# Patient Record
Sex: FEMALE | Race: WHITE | NOT HISPANIC OR LATINO | Employment: OTHER | URBAN - METROPOLITAN AREA
[De-identification: names, ages, dates, MRNs, and addresses within clinical notes are randomized per-mention and may not be internally consistent; named-entity substitution may affect disease eponyms.]

---

## 2024-04-11 ENCOUNTER — APPOINTMENT (OUTPATIENT)
Dept: CARDIOLOGY | Facility: HOSPITAL | Age: 74
End: 2024-04-11
Payer: COMMERCIAL

## 2024-04-11 ENCOUNTER — APPOINTMENT (OUTPATIENT)
Dept: RADIOLOGY | Facility: HOSPITAL | Age: 74
End: 2024-04-11
Payer: COMMERCIAL

## 2024-04-11 ENCOUNTER — HOSPITAL ENCOUNTER (OUTPATIENT)
Facility: HOSPITAL | Age: 74
Setting detail: OBSERVATION
Discharge: HOME | End: 2024-04-12
Attending: STUDENT IN AN ORGANIZED HEALTH CARE EDUCATION/TRAINING PROGRAM | Admitting: INTERNAL MEDICINE
Payer: COMMERCIAL

## 2024-04-11 DIAGNOSIS — I20.0 UNSTABLE ANGINA (MULTI): Primary | ICD-10-CM

## 2024-04-11 DIAGNOSIS — I20.0 UNSTABLE ANGINA PECTORIS (MULTI): ICD-10-CM

## 2024-04-11 DIAGNOSIS — I25.9 CHEST PAIN DUE TO MYOCARDIAL ISCHEMIA, UNSPECIFIED ISCHEMIC CHEST PAIN TYPE: ICD-10-CM

## 2024-04-11 DIAGNOSIS — R07.9 CHEST PAIN, UNSPECIFIED TYPE: ICD-10-CM

## 2024-04-11 LAB
ALBUMIN SERPL BCP-MCNC: 4.1 G/DL (ref 3.4–5)
ALP SERPL-CCNC: 104 U/L (ref 33–136)
ALT SERPL W P-5'-P-CCNC: 58 U/L (ref 7–45)
ANION GAP SERPL CALC-SCNC: 12 MMOL/L (ref 10–20)
AST SERPL W P-5'-P-CCNC: 84 U/L (ref 9–39)
BASOPHILS # BLD AUTO: 0.05 X10*3/UL (ref 0–0.1)
BASOPHILS NFR BLD AUTO: 0.7 %
BILIRUB SERPL-MCNC: 0.5 MG/DL (ref 0–1.2)
BNP SERPL-MCNC: 54 PG/ML (ref 0–99)
BUN SERPL-MCNC: 20 MG/DL (ref 6–23)
CALCIUM SERPL-MCNC: 10.3 MG/DL (ref 8.6–10.3)
CARDIAC TROPONIN I PNL SERPL HS: 6 NG/L (ref 0–13)
CARDIAC TROPONIN I PNL SERPL HS: 6 NG/L (ref 0–13)
CHLORIDE SERPL-SCNC: 105 MMOL/L (ref 98–107)
CHOLEST SERPL-MCNC: 230 MG/DL (ref 0–199)
CHOLESTEROL/HDL RATIO: 3.9
CO2 SERPL-SCNC: 24 MMOL/L (ref 21–32)
CREAT SERPL-MCNC: 0.81 MG/DL (ref 0.5–1.05)
EGFRCR SERPLBLD CKD-EPI 2021: 76 ML/MIN/1.73M*2
EOSINOPHIL # BLD AUTO: 0.21 X10*3/UL (ref 0–0.4)
EOSINOPHIL NFR BLD AUTO: 2.8 %
ERYTHROCYTE [DISTWIDTH] IN BLOOD BY AUTOMATED COUNT: 12.7 % (ref 11.5–14.5)
GLUCOSE SERPL-MCNC: 228 MG/DL (ref 74–99)
HCT VFR BLD AUTO: 47.2 % (ref 36–46)
HDLC SERPL-MCNC: 58.4 MG/DL
HGB BLD-MCNC: 15.8 G/DL (ref 12–16)
IMM GRANULOCYTES # BLD AUTO: 0.01 X10*3/UL (ref 0–0.5)
IMM GRANULOCYTES NFR BLD AUTO: 0.1 % (ref 0–0.9)
LDLC SERPL CALC-MCNC: 136 MG/DL
LYMPHOCYTES # BLD AUTO: 1.71 X10*3/UL (ref 0.8–3)
LYMPHOCYTES NFR BLD AUTO: 23.2 %
MAGNESIUM SERPL-MCNC: 1.5 MG/DL (ref 1.6–2.4)
MCH RBC QN AUTO: 29.3 PG (ref 26–34)
MCHC RBC AUTO-ENTMCNC: 33.5 G/DL (ref 32–36)
MCV RBC AUTO: 87 FL (ref 80–100)
MONOCYTES # BLD AUTO: 0.59 X10*3/UL (ref 0.05–0.8)
MONOCYTES NFR BLD AUTO: 8 %
NEUTROPHILS # BLD AUTO: 4.81 X10*3/UL (ref 1.6–5.5)
NEUTROPHILS NFR BLD AUTO: 65.2 %
NON HDL CHOLESTEROL: 172 MG/DL (ref 0–149)
NRBC BLD-RTO: 0 /100 WBCS (ref 0–0)
PLATELET # BLD AUTO: 202 X10*3/UL (ref 150–450)
POTASSIUM SERPL-SCNC: 3.8 MMOL/L (ref 3.5–5.3)
PROT SERPL-MCNC: 7.5 G/DL (ref 6.4–8.2)
RBC # BLD AUTO: 5.4 X10*6/UL (ref 4–5.2)
SODIUM SERPL-SCNC: 137 MMOL/L (ref 136–145)
TRIGL SERPL-MCNC: 178 MG/DL (ref 0–149)
VLDL: 36 MG/DL (ref 0–40)
WBC # BLD AUTO: 7.4 X10*3/UL (ref 4.4–11.3)

## 2024-04-11 PROCEDURE — 80061 LIPID PANEL: CPT | Performed by: INTERNAL MEDICINE

## 2024-04-11 PROCEDURE — 84484 ASSAY OF TROPONIN QUANT: CPT | Performed by: STUDENT IN AN ORGANIZED HEALTH CARE EDUCATION/TRAINING PROGRAM

## 2024-04-11 PROCEDURE — 93005 ELECTROCARDIOGRAM TRACING: CPT

## 2024-04-11 PROCEDURE — 99285 EMERGENCY DEPT VISIT HI MDM: CPT | Mod: 25

## 2024-04-11 PROCEDURE — 2500000001 HC RX 250 WO HCPCS SELF ADMINISTERED DRUGS (ALT 637 FOR MEDICARE OP): Performed by: STUDENT IN AN ORGANIZED HEALTH CARE EDUCATION/TRAINING PROGRAM

## 2024-04-11 PROCEDURE — 80053 COMPREHEN METABOLIC PANEL: CPT | Performed by: STUDENT IN AN ORGANIZED HEALTH CARE EDUCATION/TRAINING PROGRAM

## 2024-04-11 PROCEDURE — 99222 1ST HOSP IP/OBS MODERATE 55: CPT | Performed by: INTERNAL MEDICINE

## 2024-04-11 PROCEDURE — 36415 COLL VENOUS BLD VENIPUNCTURE: CPT | Performed by: STUDENT IN AN ORGANIZED HEALTH CARE EDUCATION/TRAINING PROGRAM

## 2024-04-11 PROCEDURE — 83735 ASSAY OF MAGNESIUM: CPT | Performed by: STUDENT IN AN ORGANIZED HEALTH CARE EDUCATION/TRAINING PROGRAM

## 2024-04-11 PROCEDURE — 83036 HEMOGLOBIN GLYCOSYLATED A1C: CPT | Performed by: INTERNAL MEDICINE

## 2024-04-11 PROCEDURE — 71046 X-RAY EXAM CHEST 2 VIEWS: CPT

## 2024-04-11 PROCEDURE — 71046 X-RAY EXAM CHEST 2 VIEWS: CPT | Performed by: STUDENT IN AN ORGANIZED HEALTH CARE EDUCATION/TRAINING PROGRAM

## 2024-04-11 PROCEDURE — 85025 COMPLETE CBC W/AUTO DIFF WBC: CPT | Performed by: STUDENT IN AN ORGANIZED HEALTH CARE EDUCATION/TRAINING PROGRAM

## 2024-04-11 PROCEDURE — 2500000004 HC RX 250 GENERAL PHARMACY W/ HCPCS (ALT 636 FOR OP/ED): Performed by: EMERGENCY MEDICINE

## 2024-04-11 PROCEDURE — 83880 ASSAY OF NATRIURETIC PEPTIDE: CPT | Performed by: STUDENT IN AN ORGANIZED HEALTH CARE EDUCATION/TRAINING PROGRAM

## 2024-04-11 PROCEDURE — G0378 HOSPITAL OBSERVATION PER HR: HCPCS

## 2024-04-11 PROCEDURE — 2500000001 HC RX 250 WO HCPCS SELF ADMINISTERED DRUGS (ALT 637 FOR MEDICARE OP): Performed by: EMERGENCY MEDICINE

## 2024-04-11 PROCEDURE — 2500000001 HC RX 250 WO HCPCS SELF ADMINISTERED DRUGS (ALT 637 FOR MEDICARE OP): Performed by: INTERNAL MEDICINE

## 2024-04-11 RX ORDER — ONDANSETRON 4 MG/1
4 TABLET, FILM COATED ORAL EVERY 8 HOURS PRN
Status: DISCONTINUED | OUTPATIENT
Start: 2024-04-11 | End: 2024-04-12 | Stop reason: HOSPADM

## 2024-04-11 RX ORDER — ACETAMINOPHEN 160 MG/5ML
650 SUSPENSION ORAL EVERY 4 HOURS PRN
Status: DISCONTINUED | OUTPATIENT
Start: 2024-04-11 | End: 2024-04-12 | Stop reason: HOSPADM

## 2024-04-11 RX ORDER — ATORVASTATIN CALCIUM 40 MG/1
40 TABLET, FILM COATED ORAL NIGHTLY
Status: DISCONTINUED | OUTPATIENT
Start: 2024-04-11 | End: 2024-04-12 | Stop reason: HOSPADM

## 2024-04-11 RX ORDER — NAPROXEN SODIUM 220 MG/1
324 TABLET, FILM COATED ORAL ONCE
Status: COMPLETED | OUTPATIENT
Start: 2024-04-11 | End: 2024-04-11

## 2024-04-11 RX ORDER — NAPROXEN SODIUM 220 MG/1
81 TABLET, FILM COATED ORAL DAILY
Status: DISCONTINUED | OUTPATIENT
Start: 2024-04-12 | End: 2024-04-12 | Stop reason: HOSPADM

## 2024-04-11 RX ORDER — POLYETHYLENE GLYCOL 3350 17 G/17G
17 POWDER, FOR SOLUTION ORAL 2 TIMES DAILY PRN
Status: DISCONTINUED | OUTPATIENT
Start: 2024-04-11 | End: 2024-04-12 | Stop reason: HOSPADM

## 2024-04-11 RX ORDER — ACETAMINOPHEN 650 MG/1
650 SUPPOSITORY RECTAL EVERY 4 HOURS PRN
Status: DISCONTINUED | OUTPATIENT
Start: 2024-04-11 | End: 2024-04-12 | Stop reason: HOSPADM

## 2024-04-11 RX ORDER — DEXTROSE 50 % IN WATER (D50W) INTRAVENOUS SYRINGE
12.5
Status: DISCONTINUED | OUTPATIENT
Start: 2024-04-11 | End: 2024-04-12 | Stop reason: HOSPADM

## 2024-04-11 RX ORDER — DEXTROSE 50 % IN WATER (D50W) INTRAVENOUS SYRINGE
25
Status: DISCONTINUED | OUTPATIENT
Start: 2024-04-11 | End: 2024-04-12 | Stop reason: HOSPADM

## 2024-04-11 RX ORDER — AMINOPHYLLINE 25 MG/ML
125 INJECTION, SOLUTION INTRAVENOUS AS NEEDED
Status: DISCONTINUED | OUTPATIENT
Start: 2024-04-11 | End: 2024-04-12 | Stop reason: HOSPADM

## 2024-04-11 RX ORDER — GUAIFENESIN/DEXTROMETHORPHAN 100-10MG/5
5 SYRUP ORAL EVERY 4 HOURS PRN
Status: DISCONTINUED | OUTPATIENT
Start: 2024-04-11 | End: 2024-04-12 | Stop reason: HOSPADM

## 2024-04-11 RX ORDER — INSULIN LISPRO 100 [IU]/ML
0-10 INJECTION, SOLUTION INTRAVENOUS; SUBCUTANEOUS
Status: DISCONTINUED | OUTPATIENT
Start: 2024-04-11 | End: 2024-04-12 | Stop reason: HOSPADM

## 2024-04-11 RX ORDER — TALC
3 POWDER (GRAM) TOPICAL NIGHTLY PRN
Status: DISCONTINUED | OUTPATIENT
Start: 2024-04-11 | End: 2024-04-12 | Stop reason: HOSPADM

## 2024-04-11 RX ORDER — ACETAMINOPHEN 325 MG/1
650 TABLET ORAL EVERY 4 HOURS PRN
Status: DISCONTINUED | OUTPATIENT
Start: 2024-04-11 | End: 2024-04-12 | Stop reason: HOSPADM

## 2024-04-11 RX ORDER — REGADENOSON 0.08 MG/ML
0.4 INJECTION, SOLUTION INTRAVENOUS
Status: COMPLETED | OUTPATIENT
Start: 2024-04-11 | End: 2024-04-12

## 2024-04-11 RX ORDER — ONDANSETRON HYDROCHLORIDE 2 MG/ML
4 INJECTION, SOLUTION INTRAVENOUS EVERY 8 HOURS PRN
Status: DISCONTINUED | OUTPATIENT
Start: 2024-04-11 | End: 2024-04-12 | Stop reason: HOSPADM

## 2024-04-11 RX ORDER — ALUMINUM HYDROXIDE, MAGNESIUM HYDROXIDE, AND SIMETHICONE 1200; 120; 1200 MG/30ML; MG/30ML; MG/30ML
30 SUSPENSION ORAL EVERY 6 HOURS PRN
Status: DISCONTINUED | OUTPATIENT
Start: 2024-04-11 | End: 2024-04-12 | Stop reason: HOSPADM

## 2024-04-11 RX ORDER — LANOLIN ALCOHOL/MO/W.PET/CERES
800 CREAM (GRAM) TOPICAL ONCE
Status: COMPLETED | OUTPATIENT
Start: 2024-04-11 | End: 2024-04-11

## 2024-04-11 RX ORDER — GUAIFENESIN 600 MG/1
600 TABLET, EXTENDED RELEASE ORAL EVERY 12 HOURS PRN
Status: DISCONTINUED | OUTPATIENT
Start: 2024-04-11 | End: 2024-04-12 | Stop reason: HOSPADM

## 2024-04-11 RX ADMIN — Medication 800 MG: at 23:07

## 2024-04-11 RX ADMIN — ATORVASTATIN CALCIUM 40 MG: 40 TABLET, FILM COATED ORAL at 23:58

## 2024-04-11 RX ADMIN — ASPIRIN 81 MG CHEWABLE TABLET 324 MG: 81 TABLET CHEWABLE at 21:33

## 2024-04-11 RX ADMIN — NITROGLYCERIN 0.5 INCH: 20 OINTMENT TOPICAL at 21:33

## 2024-04-11 ASSESSMENT — COLUMBIA-SUICIDE SEVERITY RATING SCALE - C-SSRS
2. HAVE YOU ACTUALLY HAD ANY THOUGHTS OF KILLING YOURSELF?: NO
1. IN THE PAST MONTH, HAVE YOU WISHED YOU WERE DEAD OR WISHED YOU COULD GO TO SLEEP AND NOT WAKE UP?: NO
6. HAVE YOU EVER DONE ANYTHING, STARTED TO DO ANYTHING, OR PREPARED TO DO ANYTHING TO END YOUR LIFE?: NO

## 2024-04-11 ASSESSMENT — PAIN - FUNCTIONAL ASSESSMENT
PAIN_FUNCTIONAL_ASSESSMENT: 0-10
PAIN_FUNCTIONAL_ASSESSMENT: 0-10

## 2024-04-11 ASSESSMENT — PAIN SCALES - GENERAL
PAINLEVEL_OUTOF10: 0 - NO PAIN
PAINLEVEL_OUTOF10: 0 - NO PAIN

## 2024-04-12 ENCOUNTER — APPOINTMENT (OUTPATIENT)
Dept: RADIOLOGY | Facility: HOSPITAL | Age: 74
End: 2024-04-12
Payer: COMMERCIAL

## 2024-04-12 ENCOUNTER — APPOINTMENT (OUTPATIENT)
Dept: CARDIOLOGY | Facility: HOSPITAL | Age: 74
End: 2024-04-12
Payer: COMMERCIAL

## 2024-04-12 ENCOUNTER — PHARMACY VISIT (OUTPATIENT)
Dept: PHARMACY | Facility: CLINIC | Age: 74
End: 2024-04-12
Payer: COMMERCIAL

## 2024-04-12 VITALS
HEIGHT: 62 IN | RESPIRATION RATE: 14 BRPM | HEART RATE: 96 BPM | OXYGEN SATURATION: 98 % | TEMPERATURE: 97.7 F | WEIGHT: 110 LBS | BODY MASS INDEX: 20.24 KG/M2 | DIASTOLIC BLOOD PRESSURE: 68 MMHG | SYSTOLIC BLOOD PRESSURE: 181 MMHG

## 2024-04-12 PROBLEM — I20.0 UNSTABLE ANGINA (MULTI): Status: ACTIVE | Noted: 2024-04-12

## 2024-04-12 LAB
ANION GAP SERPL CALC-SCNC: 10 MMOL/L (ref 10–20)
AORTIC VALVE MEAN GRADIENT: 4 MMHG
AORTIC VALVE PEAK VELOCITY: 1.42 M/S
AV PEAK GRADIENT: 8.1 MMHG
AVA (PEAK VEL): 2.16 CM2
AVA (VTI): 2 CM2
BUN SERPL-MCNC: 18 MG/DL (ref 6–23)
CALCIUM SERPL-MCNC: 9.4 MG/DL (ref 8.6–10.3)
CHLORIDE SERPL-SCNC: 106 MMOL/L (ref 98–107)
CO2 SERPL-SCNC: 26 MMOL/L (ref 21–32)
CREAT SERPL-MCNC: 0.7 MG/DL (ref 0.5–1.05)
EGFRCR SERPLBLD CKD-EPI 2021: 90 ML/MIN/1.73M*2
EJECTION FRACTION APICAL 4 CHAMBER: 66.7
ERYTHROCYTE [DISTWIDTH] IN BLOOD BY AUTOMATED COUNT: 12.7 % (ref 11.5–14.5)
EST. AVERAGE GLUCOSE BLD GHB EST-MCNC: 286 MG/DL
GLUCOSE BLD MANUAL STRIP-MCNC: 127 MG/DL (ref 74–99)
GLUCOSE BLD MANUAL STRIP-MCNC: 203 MG/DL (ref 74–99)
GLUCOSE SERPL-MCNC: 211 MG/DL (ref 74–99)
HBA1C MFR BLD: 11.6 %
HCT VFR BLD AUTO: 44.3 % (ref 36–46)
HGB BLD-MCNC: 15.1 G/DL (ref 12–16)
LEFT ATRIUM VOLUME AREA LENGTH INDEX BSA: 18.9 ML/M2
LEFT VENTRICLE INTERNAL DIMENSION DIASTOLE: 4 CM (ref 3.5–6)
LEFT VENTRICULAR OUTFLOW TRACT DIAMETER: 1.9 CM
LV EJECTION FRACTION BIPLANE: 65 %
MAGNESIUM SERPL-MCNC: 1.88 MG/DL (ref 1.6–2.4)
MCH RBC QN AUTO: 29.7 PG (ref 26–34)
MCHC RBC AUTO-ENTMCNC: 34.1 G/DL (ref 32–36)
MCV RBC AUTO: 87 FL (ref 80–100)
MITRAL VALVE E/A RATIO: 0.81
MITRAL VALVE E/E' RATIO: 10.82
NRBC BLD-RTO: 0 /100 WBCS (ref 0–0)
PLATELET # BLD AUTO: 177 X10*3/UL (ref 150–450)
POTASSIUM SERPL-SCNC: 4.1 MMOL/L (ref 3.5–5.3)
RBC # BLD AUTO: 5.08 X10*6/UL (ref 4–5.2)
RIGHT VENTRICLE FREE WALL PEAK S': 11.3 CM/S
RIGHT VENTRICLE PEAK SYSTOLIC PRESSURE: 21.8 MMHG
SODIUM SERPL-SCNC: 138 MMOL/L (ref 136–145)
TRICUSPID ANNULAR PLANE SYSTOLIC EXCURSION: 2.2 CM
WBC # BLD AUTO: 6.2 X10*3/UL (ref 4.4–11.3)

## 2024-04-12 PROCEDURE — 3430000001 HC RX 343 DIAGNOSTIC RADIOPHARMACEUTICALS: Performed by: INTERNAL MEDICINE

## 2024-04-12 PROCEDURE — 82947 ASSAY GLUCOSE BLOOD QUANT: CPT | Mod: 59

## 2024-04-12 PROCEDURE — 2500000004 HC RX 250 GENERAL PHARMACY W/ HCPCS (ALT 636 FOR OP/ED): Performed by: INTERNAL MEDICINE

## 2024-04-12 PROCEDURE — A9502 TC99M TETROFOSMIN: HCPCS | Performed by: INTERNAL MEDICINE

## 2024-04-12 PROCEDURE — RXMED WILLOW AMBULATORY MEDICATION CHARGE

## 2024-04-12 PROCEDURE — 93306 TTE W/DOPPLER COMPLETE: CPT

## 2024-04-12 PROCEDURE — 78452 HT MUSCLE IMAGE SPECT MULT: CPT | Performed by: STUDENT IN AN ORGANIZED HEALTH CARE EDUCATION/TRAINING PROGRAM

## 2024-04-12 PROCEDURE — 85027 COMPLETE CBC AUTOMATED: CPT | Performed by: INTERNAL MEDICINE

## 2024-04-12 PROCEDURE — 93017 CV STRESS TEST TRACING ONLY: CPT

## 2024-04-12 PROCEDURE — 99233 SBSQ HOSP IP/OBS HIGH 50: CPT | Performed by: PHYSICIAN ASSISTANT

## 2024-04-12 PROCEDURE — 2500000002 HC RX 250 W HCPCS SELF ADMINISTERED DRUGS (ALT 637 FOR MEDICARE OP, ALT 636 FOR OP/ED): Performed by: INTERNAL MEDICINE

## 2024-04-12 PROCEDURE — 93306 TTE W/DOPPLER COMPLETE: CPT | Performed by: STUDENT IN AN ORGANIZED HEALTH CARE EDUCATION/TRAINING PROGRAM

## 2024-04-12 PROCEDURE — 83735 ASSAY OF MAGNESIUM: CPT | Performed by: PHYSICIAN ASSISTANT

## 2024-04-12 PROCEDURE — G0378 HOSPITAL OBSERVATION PER HR: HCPCS

## 2024-04-12 PROCEDURE — 80048 BASIC METABOLIC PNL TOTAL CA: CPT | Performed by: INTERNAL MEDICINE

## 2024-04-12 PROCEDURE — 78452 HT MUSCLE IMAGE SPECT MULT: CPT

## 2024-04-12 PROCEDURE — 36415 COLL VENOUS BLD VENIPUNCTURE: CPT | Performed by: INTERNAL MEDICINE

## 2024-04-12 PROCEDURE — 2500000001 HC RX 250 WO HCPCS SELF ADMINISTERED DRUGS (ALT 637 FOR MEDICARE OP): Performed by: INTERNAL MEDICINE

## 2024-04-12 RX ORDER — METFORMIN HYDROCHLORIDE 500 MG/1
500 TABLET, EXTENDED RELEASE ORAL 3 TIMES DAILY
COMMUNITY
Start: 2021-09-14 | End: 2024-06-11 | Stop reason: HOSPADM

## 2024-04-12 RX ORDER — ATORVASTATIN CALCIUM 20 MG/1
20 TABLET, FILM COATED ORAL DAILY
COMMUNITY
Start: 2024-04-09

## 2024-04-12 RX ORDER — NAPROXEN SODIUM 220 MG/1
81 TABLET, FILM COATED ORAL DAILY
Qty: 30 TABLET | Refills: 0 | Status: SHIPPED | OUTPATIENT
Start: 2024-04-13 | End: 2024-05-13

## 2024-04-12 RX ADMIN — TETROFOSMIN 10 MILLICURIE: 0.23 INJECTION, POWDER, LYOPHILIZED, FOR SOLUTION INTRAVENOUS at 07:40

## 2024-04-12 RX ADMIN — REGADENOSON 0.4 MG: 0.08 INJECTION, SOLUTION INTRAVENOUS at 09:14

## 2024-04-12 RX ADMIN — ASPIRIN 81 MG CHEWABLE TABLET 81 MG: 81 TABLET CHEWABLE at 08:01

## 2024-04-12 RX ADMIN — INSULIN LISPRO 4 UNITS: 100 INJECTION, SOLUTION INTRAVENOUS; SUBCUTANEOUS at 08:01

## 2024-04-12 RX ADMIN — TETROFOSMIN 30 MILLICURIE: 0.23 INJECTION, POWDER, LYOPHILIZED, FOR SOLUTION INTRAVENOUS at 09:10

## 2024-04-12 SDOH — SOCIAL STABILITY: SOCIAL INSECURITY: HAS ANYONE EVER THREATENED TO HURT YOUR FAMILY OR YOUR PETS?: NO

## 2024-04-12 SDOH — SOCIAL STABILITY: SOCIAL INSECURITY: HAVE YOU HAD THOUGHTS OF HARMING ANYONE ELSE?: NO

## 2024-04-12 SDOH — SOCIAL STABILITY: SOCIAL INSECURITY: ARE THERE ANY APPARENT SIGNS OF INJURIES/BEHAVIORS THAT COULD BE RELATED TO ABUSE/NEGLECT?: NO

## 2024-04-12 SDOH — SOCIAL STABILITY: SOCIAL INSECURITY: WERE YOU ABLE TO COMPLETE ALL THE BEHAVIORAL HEALTH SCREENINGS?: YES

## 2024-04-12 SDOH — SOCIAL STABILITY: SOCIAL INSECURITY: DOES ANYONE TRY TO KEEP YOU FROM HAVING/CONTACTING OTHER FRIENDS OR DOING THINGS OUTSIDE YOUR HOME?: NO

## 2024-04-12 SDOH — SOCIAL STABILITY: SOCIAL INSECURITY: DO YOU FEEL UNSAFE GOING BACK TO THE PLACE WHERE YOU ARE LIVING?: NO

## 2024-04-12 SDOH — SOCIAL STABILITY: SOCIAL INSECURITY: ABUSE: ADULT

## 2024-04-12 SDOH — SOCIAL STABILITY: SOCIAL INSECURITY: ARE YOU OR HAVE YOU BEEN THREATENED OR ABUSED PHYSICALLY, EMOTIONALLY, OR SEXUALLY BY ANYONE?: NO

## 2024-04-12 SDOH — SOCIAL STABILITY: SOCIAL INSECURITY: DO YOU FEEL ANYONE HAS EXPLOITED OR TAKEN ADVANTAGE OF YOU FINANCIALLY OR OF YOUR PERSONAL PROPERTY?: NO

## 2024-04-12 ASSESSMENT — ENCOUNTER SYMPTOMS
DIZZINESS: 0
FEVER: 0
HEADACHES: 0
CHEST TIGHTNESS: 0
JOINT SWELLING: 0
FLANK PAIN: 0
VOMITING: 0
FATIGUE: 0
BLOOD IN STOOL: 0
NAUSEA: 0
HEMATURIA: 0
PALPITATIONS: 0
ABDOMINAL PAIN: 0
SHORTNESS OF BREATH: 0
WHEEZING: 0
CONSTIPATION: 0
DIARRHEA: 0
WOUND: 0
NUMBNESS: 0
FACIAL SWELLING: 0
COUGH: 0
HALLUCINATIONS: 0
LIGHT-HEADEDNESS: 0
BACK PAIN: 0
FREQUENCY: 0
SORE THROAT: 0
EYE PAIN: 0
TROUBLE SWALLOWING: 0
DIAPHORESIS: 0
APPETITE CHANGE: 0
BRUISES/BLEEDS EASILY: 0
DYSURIA: 0
CHILLS: 0
WEAKNESS: 0

## 2024-04-12 ASSESSMENT — ACTIVITIES OF DAILY LIVING (ADL)
BATHING: INDEPENDENT
DRESSING YOURSELF: INDEPENDENT
GROOMING: INDEPENDENT
LACK_OF_TRANSPORTATION: NO
WALKS IN HOME: INDEPENDENT
FEEDING YOURSELF: INDEPENDENT
TOILETING: INDEPENDENT
ASSISTIVE_DEVICE: DENTURES LOWER;DENTURES UPPER;EYEGLASSES
PATIENT'S MEMORY ADEQUATE TO SAFELY COMPLETE DAILY ACTIVITIES?: YES
HEARING - RIGHT EAR: FUNCTIONAL
JUDGMENT_ADEQUATE_SAFELY_COMPLETE_DAILY_ACTIVITIES: YES
HEARING - LEFT EAR: FUNCTIONAL
ADEQUATE_TO_COMPLETE_ADL: YES

## 2024-04-12 ASSESSMENT — LIFESTYLE VARIABLES
SKIP TO QUESTIONS 9-10: 1
AUDIT-C TOTAL SCORE: 0
SUBSTANCE_ABUSE_PAST_12_MONTHS: NO
HOW OFTEN DO YOU HAVE A DRINK CONTAINING ALCOHOL: NEVER
HOW MANY STANDARD DRINKS CONTAINING ALCOHOL DO YOU HAVE ON A TYPICAL DAY: PATIENT DOES NOT DRINK
HOW OFTEN DO YOU HAVE 6 OR MORE DRINKS ON ONE OCCASION: NEVER
AUDIT-C TOTAL SCORE: 0
PRESCIPTION_ABUSE_PAST_12_MONTHS: NO

## 2024-04-12 ASSESSMENT — COLUMBIA-SUICIDE SEVERITY RATING SCALE - C-SSRS
6. HAVE YOU EVER DONE ANYTHING, STARTED TO DO ANYTHING, OR PREPARED TO DO ANYTHING TO END YOUR LIFE?: NO
1. IN THE PAST MONTH, HAVE YOU WISHED YOU WERE DEAD OR WISHED YOU COULD GO TO SLEEP AND NOT WAKE UP?: NO
2. HAVE YOU ACTUALLY HAD ANY THOUGHTS OF KILLING YOURSELF?: NO

## 2024-04-12 ASSESSMENT — COGNITIVE AND FUNCTIONAL STATUS - GENERAL
PATIENT BASELINE BEDBOUND: NO
DAILY ACTIVITIY SCORE: 24
MOBILITY SCORE: 24

## 2024-04-12 ASSESSMENT — PAIN SCALES - GENERAL: PAINLEVEL_OUTOF10: 0 - NO PAIN

## 2024-04-12 ASSESSMENT — PATIENT HEALTH QUESTIONNAIRE - PHQ9
SUM OF ALL RESPONSES TO PHQ9 QUESTIONS 1 & 2: 1
1. LITTLE INTEREST OR PLEASURE IN DOING THINGS: NOT AT ALL
2. FEELING DOWN, DEPRESSED OR HOPELESS: SEVERAL DAYS

## 2024-04-12 NOTE — H&P
[facility]    Hospital Medicine History & Physical     Assessment & Plan     ASSESSMENT    73F with hx of tobacco abuse and NIDDM Type II presents with chest pain.     PLAN    Chest Pain  -Non-exertional, substernal pressure sensation  -No hx of CAD but multiple risk factors (smoker, HLD, NIDDM type II)  PLAN  -S/p ASA 325mg x1, ASA 81mg every day  -Nuclear stress test in the morning  -Telemetry  -Lipid panel and HgA1c    Other Issues  -HLD: Home statin. Check lipid panel  -NIDDM Type II: MDSS. Check HgA1c  -Tobacco Abuse:  cessation. Declined nicotine supplementation     DVT Prophy  -SCDs    Disposition  -Med Tele       Mehran Preciado MD    History of Present Illness     Johnny Motta is a 73 y.o. with hx of tobacco abuse, HLD, and NIDDM Type II presents with chest pain. Patient was sitting at her computer earlier in the day and started experiencing substernal chest pain that radiated to her left breast. Denies radiation to left arm or jaw. Pressure sensation. Denies associated SOB or diaphoresis. Lasted about 20mn. Took 2 81mg ASA. No hx of CAD or strokes. Has never had a stress test done before. In the ED, VSS. Trops neg x2. EKG with RBBB but no ischemic changes. Given ASA and admitted to medicine.    Review of Systems     A Comprehensive greater than 10 system review of systems was carried out.  Pertinent positives and negatives are noted above.  Otherwise negative for contributory information.     Past Medical History     Past Medical History:   Diagnosis Date    Diabetes (CMS/HCC)     Tobacco abuse      Medications   Medication reconciliation not yet complete      Past Surgical History     Past Surgical History:   Procedure Laterality Date    CHOLECYSTECTOMY  09/12/2018    Cholecystectomy     Family History   Reviewed and non-contributory to presenting complaints    Allergies     Allergies   Allergen Reactions    Penicillin G Rash     Social History     Social  "History     Tobacco Use    Smoking status: Every Day     Types: Cigarettes    Smokeless tobacco: Never   Substance Use Topics    Alcohol use: Never     Physical Exam   Blood pressure 113/74, pulse 68, temperature 37.4 °C (99.3 °F), temperature source Tympanic, resp. rate 18, height 1.575 m (5' 2\"), weight 49.9 kg (110 lb), SpO2 96%.    General: Ill appearing, cooperative with exam, in NAD.  HEENT: Atraumatic. No erythema in posterior pharynx.  Lymph: No cervical or inguinal lymphadenopathy.  Cardiac: RRR. No murmurs.  Lungs: CTAB. Nl WOB.  Abd: Non-tender. No rebound or gaurding. Nl bowel sounds.  Ext: No edema. 2+ pulses.  Skin: No rashes, abrasions, or contusions.  Psych: A&Ox3. Nl affect.  Neuro: 5/5 strength. Sensation intact.    Labs & Imaging     Reviewed and Pertinent results discussed in assessment and plan.      "

## 2024-04-12 NOTE — CARE PLAN
"The patient's goals for the shift include \"Sleep.\"    The clinical goals for the shift include Patient will remain free from acute cardiorespiratory distress for the length of my shift.    Over the shift, the patient did make progress toward the following goals.     Problem: Pain - Adult  Goal: Verbalizes/displays adequate comfort level or baseline comfort level  Outcome: Progressing     Problem: Safety - Adult  Goal: Free from fall injury  Outcome: Progressing     Problem: Discharge Planning  Goal: Discharge to home or other facility with appropriate resources  Outcome: Progressing     Problem: Chronic Conditions and Co-morbidities  Goal: Patient's chronic conditions and co-morbidity symptoms are monitored and maintained or improved  Outcome: Progressing     Problem: Diabetes  Goal: Achieve decreasing blood glucose levels by end of shift  Outcome: Progressing  Goal: Increase stability of blood glucose readings by end of shift  Outcome: Progressing  Goal: Maintain electrolyte levels within acceptable range throughout shift  Outcome: Progressing  Goal: Maintain glucose levels >70mg/dl to <250mg/dl throughout shift  Outcome: Progressing  Goal: No changes in neurological exam by end of shift  Outcome: Progressing  Goal: Learn about and adhere to nutrition recommendations by end of shift  Outcome: Progressing  Goal: Vital signs within normal range for age by end of shift  Outcome: Progressing  Goal: Increase self care and/or family involovement by end of shift  Outcome: Progressing  Goal: Receive DSME education by end of shift  Outcome: Progressing     "

## 2024-04-12 NOTE — CARE PLAN
"The patient's goals for the shift include \"Sleep.\"    The clinical goals for the shift include Patient will remain free from acute cardiorespiratory distress for the length of my shift.    Problem: Pain - Adult  Goal: Verbalizes/displays adequate comfort level or baseline comfort level  Outcome: Progressing     Problem: Safety - Adult  Goal: Free from fall injury  Outcome: Progressing     Problem: Discharge Planning  Goal: Discharge to home or other facility with appropriate resources  Outcome: Progressing     Problem: Chronic Conditions and Co-morbidities  Goal: Patient's chronic conditions and co-morbidity symptoms are monitored and maintained or improved  Outcome: Progressing     Problem: Diabetes  Goal: Achieve decreasing blood glucose levels by end of shift  Outcome: Progressing  Goal: Increase stability of blood glucose readings by end of shift  Outcome: Progressing  Goal: Maintain electrolyte levels within acceptable range throughout shift  Outcome: Progressing  Goal: Maintain glucose levels >70mg/dl to <250mg/dl throughout shift  Outcome: Progressing  Goal: No changes in neurological exam by end of shift  Outcome: Progressing  Goal: Learn about and adhere to nutrition recommendations by end of shift  Outcome: Progressing  Goal: Vital signs within normal range for age by end of shift  Outcome: Progressing  Goal: Increase self care and/or family involovement by end of shift  Outcome: Progressing  Goal: Receive DSME education by end of shift  Outcome: Progressing       "

## 2024-04-12 NOTE — ED PROVIDER NOTES
[facility]  Emergency department provider transition of care note       Assumed care of patient that was signed out to me in stable condition with work-up /disposition pending.    History/Exam limitations: none.   Additional history was obtained from past medical records.    HPI: Johnny Motta  is a 73 y.o. with a complaint of   Chief Complaint   Patient presents with    Chest Pain     1830 had episode of chest pain lasting about 20 min.  Then became really tired  seen at [facility] and sent here for further evaluation  denies SOB or nausea with pain       Past medical history and surgical history reviewed and as documented.  History reviewed and as noted. past medical records reviewed.    Past medical records, triage/nursing notes and vital signs reviewed as available and as noted above.    PHYSICAL EXAM  Vital Signs reviewed and noted to be within normal limits. the patient is not hypoxic.  -General: Alert, no acute distress, patient resting comfortably  -Skin: warm, intact, no pallor noted  -Head: Normocephalic, atraumatic  -Eye: Normal conjunctiva  -Cardiac: Normal peripheral perfusion  -Respiratory: No acute distress  -Musculoskeletal: No deformity, full ROM.  -Neurological: alert and oriented, normal sensory and motor observed.  -Psychiatric: Cooperative        Labs and imaging reviewed by me  and note     Labs Reviewed   CBC WITH AUTO DIFFERENTIAL - Abnormal       Result Value    WBC 7.4      nRBC 0.0      RBC 5.40 (*)     Hemoglobin 15.8      Hematocrit 47.2 (*)     MCV 87      MCH 29.3      MCHC 33.5      RDW 12.7      Platelets 202      Neutrophils % 65.2      Immature Granulocytes %, Automated 0.1      Lymphocytes % 23.2      Monocytes % 8.0      Eosinophils % 2.8      Basophils % 0.7      Neutrophils Absolute 4.81      Immature Granulocytes Absolute, Automated 0.01      Lymphocytes Absolute 1.71      Monocytes Absolute 0.59      Eosinophils Absolute 0.21      Basophils Absolute 0.05      COMPREHENSIVE METABOLIC PANEL - Abnormal    Glucose 228 (*)     Sodium 137      Potassium 3.8      Chloride 105      Bicarbonate 24      Anion Gap 12      Urea Nitrogen 20      Creatinine 0.81      eGFR 76      Calcium 10.3      Albumin 4.1      Alkaline Phosphatase 104      Total Protein 7.5      AST 84 (*)     Bilirubin, Total 0.5      ALT 58 (*)    MAGNESIUM - Abnormal    Magnesium 1.50 (*)    B-TYPE NATRIURETIC PEPTIDE - Normal    BNP 54      Narrative:        <100 pg/mL - Heart failure unlikely  100-299 pg/mL - Intermediate probability of acute heart                  failure exacerbation. Correlate with clinical                  context and patient history.    >=300 pg/mL - Heart Failure likely. Correlate with clinical                  context and patient history.    BNP testing is performed using different testing methodology at [facility] than at other [facility] hospitals. Direct result comparisons should only be made within the same method.      SERIAL TROPONIN-INITIAL - Normal    Troponin I, High Sensitivity 6      Narrative:     Less than 99th percentile of normal range cutoff-  Female and children under 18 years old <14 ng/L; Male <21 ng/L: Negative  Repeat testing should be performed if clinically indicated.     Female and children under 18 years old 14-50 ng/L; Male 21-50 ng/L:  Consistent with possible cardiac damage and possible increased clinical   risk. Serial measurements may help to assess extent of myocardial damage.     >50 ng/L: Consistent with cardiac damage, increased clinical risk and  myocardial infarction. Serial measurements may help assess extent of   myocardial damage.      NOTE: Children less than 1 year old may have higher baseline troponin   levels and results should be interpreted in conjunction with the overall   clinical context.     NOTE: Troponin I testing is performed using a different   testing methodology at [facility] than at other   system  [facility]. Direct result comparisons should only   be made within the same method.   SERIAL TROPONIN, 1 HOUR - Normal    Troponin I, High Sensitivity 6      Narrative:     Less than 99th percentile of normal range cutoff-  Female and children under 18 years old <14 ng/L; Male <21 ng/L: Negative  Repeat testing should be performed if clinically indicated.     Female and children under 18 years old 14-50 ng/L; Male 21-50 ng/L:  Consistent with possible cardiac damage and possible increased clinical   risk. Serial measurements may help to assess extent of myocardial damage.     >50 ng/L: Consistent with cardiac damage, increased clinical risk and  myocardial infarction. Serial measurements may help assess extent of   myocardial damage.      NOTE: Children less than 1 year old may have higher baseline troponin   levels and results should be interpreted in conjunction with the overall   clinical context.     NOTE: Troponin I testing is performed using a different   testing methodology at [facility] than at other   [facility]. Direct result comparisons should only   be made within the same method.   TROPONIN SERIES- (INITIAL, 1 HR)    Narrative:     The following orders were created for panel order Troponin I Series, High Sensitivity (0, 1 HR).  Procedure                               Abnormality         Status                     ---------                               -----------         ------                     Troponin I, High Sensiti...[[id number]]  Normal              Final result               Troponin, High Sensitivi...[[id number]]  Normal              Final result                 Please view results for these tests on the individual orders.      XR chest 2 views   Final Result   1.  No evidence of acute cardiopulmonary process.                  MACRO:   None        Signed by: Souleymane Cueva 4/11/2024 9:12 PM   Dictation workstation:   [id number]               Procedure  Procedures    Medications   magnesium oxide (Mag-Ox) tablet 800 mg (has no administration in time range)   aspirin chewable tablet 324 mg (324 mg oral Given 4/11/24 2133)   nitroglycerin (Hugo-Bid) 2 % ointment 0.5 inch (0.5 inches transdermal Given 4/11/24 2133)        ED Course as of 04/11/24 2241   Thu Apr 11, 2024 2017 EKG is interpreted by myself demonstrates sinus rhythm with a ventricular rate of 92, right bundle branch block noted, no evidence of an acute STEMI although the right bundle block is new from 2013 [NS]      ED Course User Index  [NS] Sarmad Dia MD         Diagnoses as of 04/11/24 2241   Unstable angina (CMS/HCC)        1. Unstable angina (CMS/HCC)             DISPOSITION: Admit to observation    Marty LeJeune, DO  Internal & Emergency Medicine  10:41 PM   04/11/24        Marty R Lejeune, DO  Resident  04/11/24 2242

## 2024-04-12 NOTE — PROGRESS NOTES
Discharge planning assessment attempted. Patient is currently out of the room for testing/procedure. Patient is OBS status, here for cardiac workup. No discharge needs anticipated. TCC following, will attempt to meet with patient once they have returned to their room.

## 2024-04-12 NOTE — PROGRESS NOTES
Johnny Motta is a 73 y.o. female on day 0 of admission presenting with Chest pain.      Subjective   Johnny Motta is a 73 y.o. with hx of tobacco abuse, HLD, and NIDDM Type II presents with chest pain. Patient was sitting at her computer earlier in the day and started experiencing substernal chest pain that radiated to her left breast. Denies radiation to left arm or jaw. Pressure sensation. Denies associated SOB or diaphoresis. Lasted about 20mn. Took 2 81mg ASA. No hx of CAD or strokes. Has never had a stress test done before. In the ED, VSS. Trops neg x2. BNP neg. EKG with RBBB but no ischemic changes.     04/12/24: No acute events overnight. Vitals stable. Labs largely unremarkable.  Hyperglycemia 211. Mag WNL.  She did have some chest pressure though very minimal during stress test.  Echocardiogram with EF 60 to 65%, positive diastolic dysfunction, no wall motion abnormalities.  Noted her A1c is 11.6, we discussed this, she was just seen [facility], she had not been on any medications for her diabetes or her high cholesterol for the proximately the past 2 years, she was just started back on metformin, we discussed lifestyle modifications and that she may need addition of further medications to treat her uncontrolled diabetes, she wishes to follow this up with her primary care.  She is hopeful discharge home today if her stress test is negative         Review of Systems   Constitutional:  Negative for appetite change, chills, diaphoresis, fatigue and fever.   HENT:  Negative for congestion, ear pain, facial swelling, hearing loss, nosebleeds, sore throat, tinnitus and trouble swallowing.    Eyes:  Negative for pain.   Respiratory:  Negative for cough, chest tightness, shortness of breath and wheezing.    Cardiovascular:  Negative for chest pain, palpitations and leg swelling.   Gastrointestinal:  Negative for abdominal pain, blood in stool, constipation, diarrhea,  nausea and vomiting.   Genitourinary:  Negative for dysuria, flank pain, frequency, hematuria and urgency.   Musculoskeletal:  Negative for back pain and joint swelling.   Skin:  Negative for rash and wound.   Neurological:  Negative for dizziness, syncope, weakness, light-headedness, numbness and headaches.   Hematological:  Does not bruise/bleed easily.   Psychiatric/Behavioral:  Negative for behavioral problems, hallucinations and suicidal ideas.           Objective     Last Recorded Vitals  /67 (BP Location: Left arm, Patient Position: Lying)   Pulse 70   Temp 36 °C (96.8 °F) (Temporal)   Resp 16   Wt 49.9 kg (110 lb)   SpO2 94%     Image Results  Transthoracic Echo (TTE) Complete    Result Date: 4/12/2024              [place] [address]      Phone [phone] Fax [phone] TRANSTHORACIC ECHOCARDIOGRAM REPORT  Patient Name:      LITA MCCLURE FLACA Carty Physician:    [id number] Downey MD Study Date:        4/12/2024             Ordering Provider:    [id number] ILDA BROCK MRN/PID:           [id number]              Fellow: Accession#:        [id number]          Nurse: Date of Birth/Age: 1950 / 73 years Sonographer:          Swapna Antoine RDCS Gender:            F                     Additional Staff: Height:            157.48 cm             Admit Date:           4/11/2024 Weight:            49.90 kg              Admission Status:     Inpatient -                                                                Routine BSA / BMI:         1.48 m2 / 20.12 kg/m2 Department Location:  [facility]                                                                Lab Blood Pressure: 116 /67 mmHg Study Type:    TRANSTHORACIC ECHO (TTE) COMPLETE Diagnosis/ICD: Chest pain, unspecified-R07.9 Indication:    Chest Pain CPT  Codes:     Echo Complete w Full [id number] Patient History: No pertinent past medical history. Study Detail: The following Echo studies were performed: 2D, M-Mode, Doppler and               color flow.  PHYSICIAN INTERPRETATION: Left Ventricle: Left ventricular systolic function is normal, with an estimated ejection fraction of 60-65%. There are no regional wall motion abnormalities. The left ventricular cavity size is normal. Spectral Doppler shows an impaired relaxation pattern of left ventricular diastolic filling. Left Atrium: The left atrium is normal in size. Right Ventricle: The right ventricle is normal in size. There is normal right ventricular global systolic function. Right Atrium: The right atrium is mildly dilated. Aortic Valve: The aortic valve is trileaflet. There is no evidence of aortic valve regurgitation. The peak instantaneous gradient of the aortic valve is 8.1 mmHg. The mean gradient of the aortic valve is 4.0 mmHg. Mitral Valve: The mitral valve is normal in structure. There is trace mitral valve regurgitation. Tricuspid Valve: The tricuspid valve is structurally normal. There is trace tricuspid regurgitation. The Doppler estimated RVSP is within normal limits at 21.8 mmHg. Pulmonic Valve: The pulmonic valve is not well visualized. There is trace pulmonic valve regurgitation. Pericardium: There is no pericardial effusion noted. There is a pericardial fat pad present. Aorta: The aortic root is normal. Systemic Veins: The inferior vena cava size appears small.  CONCLUSIONS:  1. Left ventricular systolic function is normal with a 60-65% estimated ejection fraction.  2. Spectral Doppler shows an impaired relaxation pattern of left ventricular diastolic filling.  3. RVSP within normal limits. QUANTITATIVE DATA SUMMARY: 2D MEASUREMENTS:                          Normal Ranges: Ao Root d:     3.00 cm   (2.0-3.7cm) LAs:           3.10 cm   (2.7-4.0cm) IVSd:          0.80 cm   (0.6-1.1cm) LVPWd:          0.80 cm   (0.6-1.1cm) LVIDd:         4.00 cm   (3.9-5.9cm) LVIDs:         2.30 cm LV Mass Index: 63.0 g/m2 LV % FS        42.5 % LA VOLUME:                               Normal Ranges: LA Vol A4C:        23.1 ml    (22+/-6mL/m2) LA Vol A2C:        30.1 ml LA Vol BP:         28.0 ml LA Vol Index A4C:  15.6ml/m2 LA Vol Index A2C:  20.3 ml/m2 LA Vol Index BP:   18.9 ml/m2 LA Area A4C:       12.0 cm2 LA Area A2C:       12.9 cm2 LA Major Axis A4C: 5.3 cm LA Major Axis A2C: 4.7 cm LA Volume Index:   18.9 ml/m2 RA VOLUME BY A/L METHOD:                       Normal Ranges: RA Area A4C: 14.1 cm2 AORTA MEASUREMENTS:                      Normal Ranges: Ao Sinus, d: 3.00 cm (2.1-3.5cm) Ao STJ, d:   2.60 cm (1.7-3.4cm) Asc Ao, d:   2.90 cm (2.1-3.4cm) LV SYSTOLIC FUNCTION BY 2D PLANIMETRY (MOD):                     Normal Ranges: EF-A4C View: 66.7 % (>=55%) EF-A2C View: 63.7 % EF-Biplane:  64.8 % LV DIASTOLIC FUNCTION:                           Normal Ranges: MV Peak E:    0.70 m/s    (0.7-1.2 m/s) MV Peak A:    0.87 m/s    (0.42-0.7 m/s) E/A Ratio:    0.81        (1.0-2.2) MV e'         0.06 m/s    (>8.0) MV lateral e' 0.08 m/s MV medial e'  0.05 m/s MV A Dur:     135.00 msec E/e' Ratio:   10.82       (<8.0) AORTIC VALVE:                                   Normal Ranges: AoV Vmax:                1.42 m/s (<=1.7m/s) AoV Peak P.1 mmHg (<20mmHg) AoV Mean P.0 mmHg (1.7-11.5mmHg) LVOT Max Devyn:            1.08 m/s (<=1.1m/s) AoV VTI:                 31.00 cm (18-25cm) LVOT VTI:                21.90 cm LVOT Diameter:           1.90 cm  (1.8-2.4cm) AoV Area, VTI:           2.00 cm2 (2.5-5.5cm2) AoV Area,Vmax:           2.16 cm2 (2.5-4.5cm2) AoV Dimensionless Index: 0.71  RIGHT VENTRICLE: RV Basal 3.30 cm RV Mid   2.50 cm RV Major 6.6 cm TAPSE:   22.4 mm RV s'    0.11 m/s TRICUSPID VALVE/RVSP:                             Normal Ranges: Peak TR Velocity: 2.17 m/s RV Syst Pressure: 21.8 mmHg (< 30mmHg)  IVC Diam:         1.00 cm  [id number] Davis Downey MD Electronically signed on 4/12/2024 at 11:08:05 AM  ** Final **     ECG 12 lead    Result Date: 4/12/2024  Sinus rhythm Probable left atrial enlargement Right bundle branch block    ECG 12 lead    Result Date: 4/12/2024  Sinus rhythm Probable left atrial enlargement Right bundle branch block    XR chest 2 views    Result Date: 4/11/2024  Interpreted By:  Souleymane Cueva, STUDY: XR CHEST 2 VIEWS;  4/11/2024 8:44 pm   INDICATION: Signs/Symptoms:Chest Pain.   COMPARISON: Radiographs of the chest dated 12/30/2014.   ACCESSION NUMBER(S): [id number]   ORDERING CLINICIAN: QUINN PENDLETON   FINDINGS: PA and lateral radiographs of the chest were provided.       CARDIOMEDIASTINAL SILHOUETTE: Cardiomediastinal silhouette is normal in size and configuration.   LUNGS: Lungs are clear.   ABDOMEN: No remarkable upper abdominal findings.   BONES: No acute osseous changes.       1.  No evidence of acute cardiopulmonary process.       MACRO: None   Signed by: Souleymane Cueva 4/11/2024 9:12 PM Dictation workstation:   [id number]       Lab Results  Results for orders placed or performed during the hospital encounter of 04/11/24 (from the past 24 hour(s))   ECG 12 lead   Result Value Ref Range    Ventricular Rate 92 BPM    Atrial Rate 92 BPM    NE Interval 124 ms    QRS Duration 128 ms    QT Interval 365 ms    QTC Calculation(Bazett) 452 ms    P Axis 80 degrees    R Axis 2 degrees    T Axis 7 degrees    QRS Count 14 beats    Q Onset 251 ms    T Offset 433 ms    QTC Fredericia 420 ms   CBC and Auto Differential   Result Value Ref Range    WBC 7.4 4.4 - 11.3 x10*3/uL    nRBC 0.0 0.0 - 0.0 /100 WBCs    RBC 5.40 (H) 4.00 - 5.20 x10*6/uL    Hemoglobin 15.8 12.0 - 16.0 g/dL    Hematocrit 47.2 (H) 36.0 - 46.0 %    MCV 87 80 - 100 fL    MCH 29.3 26.0 - 34.0 pg    MCHC 33.5 32.0 - 36.0 g/dL    RDW 12.7 11.5 - 14.5 %    Platelets 202 150 - 450 x10*3/uL    Neutrophils % 65.2  40.0 - 80.0 %    Immature Granulocytes %, Automated 0.1 0.0 - 0.9 %    Lymphocytes % 23.2 13.0 - 44.0 %    Monocytes % 8.0 2.0 - 10.0 %    Eosinophils % 2.8 0.0 - 6.0 %    Basophils % 0.7 0.0 - 2.0 %    Neutrophils Absolute 4.81 1.60 - 5.50 x10*3/uL    Immature Granulocytes Absolute, Automated 0.01 0.00 - 0.50 x10*3/uL    Lymphocytes Absolute 1.71 0.80 - 3.00 x10*3/uL    Monocytes Absolute 0.59 0.05 - 0.80 x10*3/uL    Eosinophils Absolute 0.21 0.00 - 0.40 x10*3/uL    Basophils Absolute 0.05 0.00 - 0.10 x10*3/uL   Comprehensive Metabolic Panel   Result Value Ref Range    Glucose 228 (H) 74 - 99 mg/dL    Sodium 137 136 - 145 mmol/L    Potassium 3.8 3.5 - 5.3 mmol/L    Chloride 105 98 - 107 mmol/L    Bicarbonate 24 21 - 32 mmol/L    Anion Gap 12 10 - 20 mmol/L    Urea Nitrogen 20 6 - 23 mg/dL    Creatinine 0.81 0.50 - 1.05 mg/dL    eGFR 76 >60 mL/min/1.73m*2    Calcium 10.3 8.6 - 10.3 mg/dL    Albumin 4.1 3.4 - 5.0 g/dL    Alkaline Phosphatase 104 33 - 136 U/L    Total Protein 7.5 6.4 - 8.2 g/dL    AST 84 (H) 9 - 39 U/L    Bilirubin, Total 0.5 0.0 - 1.2 mg/dL    ALT 58 (H) 7 - 45 U/L   Magnesium   Result Value Ref Range    Magnesium 1.50 (L) 1.60 - 2.40 mg/dL   B-type natriuretic peptide   Result Value Ref Range    BNP 54 0 - 99 pg/mL   Troponin I, High Sensitivity, Initial   Result Value Ref Range    Troponin I, High Sensitivity 6 0 - 13 ng/L   Lipid panel   Result Value Ref Range    Cholesterol 230 (H) 0 - 199 mg/dL    HDL-Cholesterol 58.4 mg/dL    Cholesterol/HDL Ratio 3.9     LDL Calculated 136 (H) <=99 mg/dL    VLDL 36 0 - 40 mg/dL    Triglycerides 178 (H) 0 - 149 mg/dL    Non HDL Cholesterol 172 (H) 0 - 149 mg/dL   Hemoglobin A1c   Result Value Ref Range    Hemoglobin A1C 11.6 (H) see below %    Estimated Average Glucose 286 Not Established mg/dL   ECG 12 lead   Result Value Ref Range    Ventricular Rate 81 BPM    Atrial Rate 80 BPM    AZ Interval 132 ms    QRS Duration 125 ms    QT Interval 375 ms    QTC  Calculation(Bazett) 436 ms    P Axis 70 degrees    R Axis -38 degrees    T Axis 29 degrees    QRS Count 13 beats    Q Onset 249 ms    T Offset 437 ms    QTC Fredericia 414 ms   Troponin, High Sensitivity, 1 Hour   Result Value Ref Range    Troponin I, High Sensitivity 6 0 - 13 ng/L   CBC   Result Value Ref Range    WBC 6.2 4.4 - 11.3 x10*3/uL    nRBC 0.0 0.0 - 0.0 /100 WBCs    RBC 5.08 4.00 - 5.20 x10*6/uL    Hemoglobin 15.1 12.0 - 16.0 g/dL    Hematocrit 44.3 36.0 - 46.0 %    MCV 87 80 - 100 fL    MCH 29.7 26.0 - 34.0 pg    MCHC 34.1 32.0 - 36.0 g/dL    RDW 12.7 11.5 - 14.5 %    Platelets 177 150 - 450 x10*3/uL   Basic metabolic panel   Result Value Ref Range    Glucose 211 (H) 74 - 99 mg/dL    Sodium 138 136 - 145 mmol/L    Potassium 4.1 3.5 - 5.3 mmol/L    Chloride 106 98 - 107 mmol/L    Bicarbonate 26 21 - 32 mmol/L    Anion Gap 10 10 - 20 mmol/L    Urea Nitrogen 18 6 - 23 mg/dL    Creatinine 0.70 0.50 - 1.05 mg/dL    eGFR 90 >60 mL/min/1.73m*2    Calcium 9.4 8.6 - 10.3 mg/dL   Magnesium   Result Value Ref Range    Magnesium 1.88 1.60 - 2.40 mg/dL   POCT GLUCOSE   Result Value Ref Range    POCT Glucose 203 (H) 74 - 99 mg/dL   Transthoracic Echo (TTE) Complete   Result Value Ref Range    LVOT diam 1.90 cm    LV Biplane EF 65 %    MV E/A ratio 0.81     MV avg E/e' ratio 10.82     Tricuspid annular plane systolic excursion 2.2 cm    AV mn grad 4.0 mmHg    LA vol index A/L 18.9 ml/m2    AV pk dora 1.42 m/s    RV free wall pk S' 11.30 cm/s    RVSP 21.8 mmHg    LVIDd 4.00 cm    Aortic Valve Area by Continuity of VTI 2.00 cm2    Aortic Valve Area by Continuity of Peak Velocity 2.16 cm2    AV pk grad 8.1 mmHg    LV A4C EF 66.7    POCT GLUCOSE   Result Value Ref Range    POCT Glucose 127 (H) 74 - 99 mg/dL        Medications  Scheduled medications:  aspirin, 81 mg, oral, Daily  atorvastatin, 40 mg, oral, Nightly  insulin lispro, 0-10 Units, subcutaneous, TID with meals      Continuous medications:     PRN medications:  PRN  medications: acetaminophen **OR** acetaminophen **OR** acetaminophen, alum-mag hydroxide-simeth, aminophylline, benzocaine-menthol, dextromethorphan-guaifenesin, dextrose, dextrose, glucagon, glucagon, guaiFENesin, melatonin, ondansetron **OR** ondansetron, polyethylene glycol     Physical Exam  Constitutional:       General: She is not in acute distress.     Appearance: Normal appearance. She is normal weight.   HENT:      Head: Normocephalic and atraumatic.      Right Ear: External ear normal.      Left Ear: External ear normal.      Nose: Nose normal.      Mouth/Throat:      Mouth: Mucous membranes are moist.      Pharynx: Oropharynx is clear.   Eyes:      Extraocular Movements: Extraocular movements intact.      Conjunctiva/sclera: Conjunctivae normal.      Pupils: Pupils are equal, round, and reactive to light.   Cardiovascular:      Rate and Rhythm: Normal rate and regular rhythm.      Pulses: Normal pulses.      Heart sounds: Normal heart sounds.   Pulmonary:      Effort: Pulmonary effort is normal. No respiratory distress.      Breath sounds: Normal breath sounds. No wheezing, rhonchi or rales.   Abdominal:      General: Bowel sounds are normal.      Palpations: Abdomen is soft.      Tenderness: There is no abdominal tenderness. There is no right CVA tenderness, left CVA tenderness, guarding or rebound.   Musculoskeletal:         General: No swelling. Normal range of motion.      Cervical back: Normal range of motion and neck supple.   Skin:     General: Skin is warm and dry.      Capillary Refill: Capillary refill takes less than 2 seconds.      Findings: No lesion or rash.   Neurological:      General: No focal deficit present.      Mental Status: She is alert and oriented to person, place, and time. Mental status is at baseline.   Psychiatric:         Mood and Affect: Mood normal.         Behavior: Behavior normal.                  Code Status  Full Code     Assessment/Plan      Chest  Pain  -Non-exertional, substernal pressure sensation  -No hx of CAD but multiple risk factors (smoker, HLD, NIDDM type II)  -ASA 81mg every day  -Nuclear stress test   -Echo: EF 60-65%, no WMA, +DD  -Telemetry  -Lipid panel and HgA1c as above     HLD  -Home statin  -Lipid panel as above, recently started back on atorvastatin this past Tuesday-would continue    NIDDM Type II  -Sliding scale insulin  -Hold metformin for now-will need to resume on discharge  -Hemoglobin A1c is 11.6, discussed this extensively with patient, she had not been on any medications for approximately 2 years as she had not been seeing any primary care, she [facility] and was restarted back on metformin for A1c of same.  We discussed that she likely needs additional agents given her elevated A1c however she wishes to discuss this further with her primary care.  She will trial lifestyle modifications for time being    Tobacco Abuse  -Declined nicotine supplementation   -Smoking cessation education >3 minutes provided, she wishes to quit at this time     DVT PPX: SCDs    Please see orders for more complete plan    Thea Barnhart PA-C

## 2024-04-12 NOTE — ED PROVIDER NOTES
Department of Emergency Medicine   ED  Provider Note  Admit Date/RoomTime: 2024  8:19 PM  ED Room: [facility]                  History of Present Illness:   Johnny Motta is a 73 y.o. female presenting to the ED for chest pain and, beginning about 2 to 3 hours ago.  The complaint has been intermittent, moderate in severity, and worsened by nothing.  Patient was sitting at her computer and she developed substernal chest pressure like someone sitting on her chest.  It radiated across to the left breast and shoulder region.  She denies any diaphoresis or significant shortness of breath.  She said she felt very weak though.  She did take a couple of possibly  [place] aspirin and lay down for a bit and continued to have pain so then went to urgent care.  Urgent care sent her to the emergency room.  On arrival here she says her chest pain has markedly improved.  She denies any diaphoresis.  She has had no recent symptoms.  No recent travel.  She is a type II diabetic and does take medicine for hyperlipidemia.  She is a smoker about a half a pack to 1 pack of cigarettes per day.  No alcohol use.  No marijuana use.  No known drug use.  Her mother has history a couple of minor heart attacks.  No recent travel.  No recent surgeries.  No leg pain swelling or calf tenderness.      Review of Systems:   Pertinent positives and review of systems as noted above.  Remaining 10 review of systems is negative or noncontributory to today's episode of care.  Review of Systems   A complete review of systems was completed and is negative as noted above.    --------------------------------------------- PAST HISTORY ---------------------------------------------  Past Medical History:  has no past medical history on file.  Type 2 diabetes and hyperlipidemia    Past Surgical History:  has a past surgical history that includes Cholecystectomy (2018).    Social History:  reports that she has been smoking cigarettes. She has never  used smokeless tobacco. She reports that she does not drink alcohol and does not use drugs.    Family History: family history is not on file. Unless otherwise noted, family history is non contributory  Mother has positive history for a mild heart attack    Patient's Medications    No medications on file      The patient’s home medications have been reviewed.    Allergies: Penicillin g    -------------------------------------------------- RESULTS -------------------------------------------------  All laboratory and radiology results have been personally reviewed by myself   LABS:  Labs Reviewed   CBC WITH AUTO DIFFERENTIAL - Abnormal       Result Value    WBC 7.4      nRBC 0.0      RBC 5.40 (*)     Hemoglobin 15.8      Hematocrit 47.2 (*)     MCV 87      MCH 29.3      MCHC 33.5      RDW 12.7      Platelets 202      Neutrophils % 65.2      Immature Granulocytes %, Automated 0.1      Lymphocytes % 23.2      Monocytes % 8.0      Eosinophils % 2.8      Basophils % 0.7      Neutrophils Absolute 4.81      Immature Granulocytes Absolute, Automated 0.01      Lymphocytes Absolute 1.71      Monocytes Absolute 0.59      Eosinophils Absolute 0.21      Basophils Absolute 0.05     COMPREHENSIVE METABOLIC PANEL - Abnormal    Glucose 228 (*)     Sodium 137      Potassium 3.8      Chloride 105      Bicarbonate 24      Anion Gap 12      Urea Nitrogen 20      Creatinine 0.81      eGFR 76      Calcium 10.3      Albumin 4.1      Alkaline Phosphatase 104      Total Protein 7.5      AST 84 (*)     Bilirubin, Total 0.5      ALT 58 (*)    MAGNESIUM - Abnormal    Magnesium 1.50 (*)    B-TYPE NATRIURETIC PEPTIDE - Normal    BNP 54      Narrative:        <100 pg/mL - Heart failure unlikely  100-299 pg/mL - Intermediate probability of acute heart                  failure exacerbation. Correlate with clinical                  context and patient history.    >=300 pg/mL - Heart Failure likely. Correlate with clinical                  context and  patient history.    BNP testing is performed using different testing methodology at [facility] than at other [facility]. Direct result comparisons should only be made within the same method.      SERIAL TROPONIN-INITIAL - Normal    Troponin I, High Sensitivity 6      Narrative:     Less than 99th percentile of normal range cutoff-  Female and children under 18 years old <14 ng/L; Male <21 ng/L: Negative  Repeat testing should be performed if clinically indicated.     Female and children under 18 years old 14-50 ng/L; Male 21-50 ng/L:  Consistent with possible cardiac damage and possible increased clinical   risk. Serial measurements may help to assess extent of myocardial damage.     >50 ng/L: Consistent with cardiac damage, increased clinical risk and  myocardial infarction. Serial measurements may help assess extent of   myocardial damage.      NOTE: Children less than 1 year old may have higher baseline troponin   levels and results should be interpreted in conjunction with the overall   clinical context.     NOTE: Troponin I testing is performed using a different   testing methodology at [facility] than at other   [facility]. Direct result comparisons should only   be made within the same method.   TROPONIN SERIES- (INITIAL, 1 HR)    Narrative:     The following orders were created for panel order Troponin I Series, High Sensitivity (0, 1 HR).  Procedure                               Abnormality         Status                     ---------                               -----------         ------                     Troponin I, High Sensiti...[[id number]]  Normal              Final result               Troponin, High Sensitivi...[[id number]]                                                   Please view results for these tests on the individual orders.   SERIAL TROPONIN, 1 HOUR         RADIOLOGY:  Interpreted by Radiologist.  XR chest 2 views    (Results Pending)       No  "results found for this or any previous visit (from the past 4464 hour(s)).  ------------------------- NURSING NOTES AND VITALS REVIEWED ---------------------------   The nursing notes within the ED encounter and vital signs as below have been reviewed.   /71 (BP Location: Left arm, Patient Position: Sitting)   Pulse 87   Temp 37.4 °C (99.3 °F) (Tympanic)   Resp 20   Ht 1.575 m (5' 2\")   Wt 49.9 kg (110 lb)   SpO2 94%   BMI 20.12 kg/m²   Oxygen Saturation Interpretation: Normal      ---------------------------------------------------PHYSICAL EXAM--------------------------------------  Physical Exam  Vitals and nursing note reviewed.   Constitutional:       General: She is not in acute distress.     Appearance: She is well-developed and normal weight. She is not ill-appearing, toxic-appearing or diaphoretic.   HENT:      Head: Normocephalic.   Eyes:      Extraocular Movements: Extraocular movements intact.      Pupils: Pupils are equal, round, and reactive to light.   Neck:      Thyroid: No thyromegaly.      Vascular: No hepatojugular reflux or JVD.      Trachea: No tracheal deviation.   Cardiovascular:      Rate and Rhythm: Normal rate and regular rhythm. No extrasystoles are present.     Chest Wall: PMI is not displaced.      Pulses:           Carotid pulses are 2+ on the right side and 2+ on the left side.       Radial pulses are 2+ on the right side and 2+ on the left side.        Dorsalis pedis pulses are 2+ on the right side and 2+ on the left side.        Posterior tibial pulses are 2+ on the right side and 2+ on the left side.      Heart sounds: Normal heart sounds. Heart sounds not distant. No murmur heard.     No friction rub. No gallop.   Pulmonary:      Effort: Pulmonary effort is normal. No tachypnea, accessory muscle usage or respiratory distress.      Breath sounds: Normal breath sounds. No stridor. No decreased breath sounds, wheezing, rhonchi or rales.   Chest:      Chest wall: No mass, " deformity, tenderness, crepitus or edema. There is no dullness to percussion.   Abdominal:      General: Bowel sounds are normal. There is no abdominal bruit.      Palpations: Abdomen is soft. There is no hepatomegaly or mass.      Tenderness: There is no abdominal tenderness. There is no guarding or rebound.   Musculoskeletal:         General: Normal range of motion.      Cervical back: Normal range of motion and neck supple.      Right lower leg: No tenderness. No edema.      Left lower leg: No tenderness. No edema.   Lymphadenopathy:      Cervical: No cervical adenopathy.   Skin:     General: Skin is warm and dry.      Capillary Refill: Capillary refill takes less than 2 seconds.      Coloration: Skin is not cyanotic.      Nails: There is no clubbing.   Neurological:      General: No focal deficit present.      Mental Status: She is alert and oriented to person, place, and time.   Psychiatric:         Mood and Affect: Mood normal.            Procedures  NONE  ------------------------------ ED COURSE/MEDICAL DECISION MAKING----------------------    Medical Decision Making:   Patient seen and examined me  And IV is started appropriate labs are ordered.  Cardiac workup has been ordered.  Aspirin and nitroglycerin 0.5 inch applied to the chest wall.    ED Course as of 04/11/24 2113   Thu Apr 11, 2024 2017 EKG is interpreted by myself demonstrates sinus rhythm with a ventricular rate of 92, right bundle branch block noted, no evidence of an acute STEMI although the right bundle block is new from 2013 [NS]      ED Course User Index  [NS] Sarmad Dia MD      Counseling:   The emergency provider has spoken with the patient and discussed today’s results, in addition to providing specific details for the plan of care and counseling regarding the diagnosis and prognosis.  Questions are answered at this time and they are agreeable with the plan.      --------------------------------- IMPRESSION AND DISPOSITION  ---------------------------------        IMPRESSION  No diagnosis found.    DISPOSITION  Disposition: signed out on shift change to Dr M LeJeune  Patient condition is fair      Billing Provider Critical Care Time: 0 minutes     Tariq Curtis DO  04/11/24 [id number]

## 2024-04-14 LAB
ATRIAL RATE: 80 BPM
ATRIAL RATE: 92 BPM
P AXIS: 70 DEGREES
P AXIS: 80 DEGREES
PR INTERVAL: 124 MS
PR INTERVAL: 132 MS
Q ONSET: 249 MS
Q ONSET: 251 MS
QRS COUNT: 13 BEATS
QRS COUNT: 14 BEATS
QRS DURATION: 125 MS
QRS DURATION: 128 MS
QT INTERVAL: 365 MS
QT INTERVAL: 375 MS
QTC CALCULATION(BAZETT): 436 MS
QTC CALCULATION(BAZETT): 452 MS
QTC FREDERICIA: 414 MS
QTC FREDERICIA: 420 MS
R AXIS: -38 DEGREES
R AXIS: 2 DEGREES
T AXIS: 29 DEGREES
T AXIS: 7 DEGREES
T OFFSET: 433 MS
T OFFSET: 437 MS
VENTRICULAR RATE: 81 BPM
VENTRICULAR RATE: 92 BPM

## 2024-05-01 ENCOUNTER — APPOINTMENT (OUTPATIENT)
Dept: PRIMARY CARE | Facility: CLINIC | Age: 74
End: 2024-05-01
Payer: COMMERCIAL

## 2024-06-07 ENCOUNTER — APPOINTMENT (OUTPATIENT)
Dept: RADIOLOGY | Facility: HOSPITAL | Age: 74
DRG: 871 | End: 2024-06-07
Payer: COMMERCIAL

## 2024-06-07 ENCOUNTER — APPOINTMENT (OUTPATIENT)
Dept: CARDIOLOGY | Facility: HOSPITAL | Age: 74
DRG: 871 | End: 2024-06-07
Payer: COMMERCIAL

## 2024-06-07 ENCOUNTER — HOSPITAL ENCOUNTER (INPATIENT)
Facility: HOSPITAL | Age: 74
LOS: 3 days | Discharge: HOME | End: 2024-06-11
Attending: STUDENT IN AN ORGANIZED HEALTH CARE EDUCATION/TRAINING PROGRAM | Admitting: INTERNAL MEDICINE
Payer: COMMERCIAL

## 2024-06-07 DIAGNOSIS — A41.9 SEPSIS DUE TO PNEUMONIA (MULTI): Primary | ICD-10-CM

## 2024-06-07 DIAGNOSIS — J18.9 SEPSIS DUE TO PNEUMONIA (MULTI): Primary | ICD-10-CM

## 2024-06-07 DIAGNOSIS — E11.9 CONTROLLED TYPE 2 DIABETES MELLITUS WITHOUT COMPLICATION, WITH LONG-TERM CURRENT USE OF INSULIN (MULTI): ICD-10-CM

## 2024-06-07 DIAGNOSIS — A48.1 LEGIONELLA PNEUMONIA (MULTI): ICD-10-CM

## 2024-06-07 DIAGNOSIS — E11.65 CONTROLLED TYPE 2 DIABETES MELLITUS WITH HYPERGLYCEMIA, WITH LONG-TERM CURRENT USE OF INSULIN (MULTI): ICD-10-CM

## 2024-06-07 DIAGNOSIS — Z79.4 CONTROLLED TYPE 2 DIABETES MELLITUS WITH HYPERGLYCEMIA, WITH LONG-TERM CURRENT USE OF INSULIN (MULTI): ICD-10-CM

## 2024-06-07 DIAGNOSIS — Z79.4 CONTROLLED TYPE 2 DIABETES MELLITUS WITHOUT COMPLICATION, WITH LONG-TERM CURRENT USE OF INSULIN (MULTI): ICD-10-CM

## 2024-06-07 LAB
ALBUMIN SERPL BCP-MCNC: 3.6 G/DL (ref 3.4–5)
ALP SERPL-CCNC: 72 U/L (ref 33–136)
ALT SERPL W P-5'-P-CCNC: 19 U/L (ref 7–45)
ANION GAP SERPL CALC-SCNC: 16 MMOL/L (ref 10–20)
AST SERPL W P-5'-P-CCNC: 31 U/L (ref 9–39)
BASOPHILS # BLD MANUAL: 0 X10*3/UL (ref 0–0.1)
BASOPHILS NFR BLD MANUAL: 0 %
BILIRUB SERPL-MCNC: 0.6 MG/DL (ref 0–1.2)
BUN SERPL-MCNC: 20 MG/DL (ref 6–23)
CALCIUM SERPL-MCNC: 9.8 MG/DL (ref 8.6–10.3)
CARDIAC TROPONIN I PNL SERPL HS: 60 NG/L (ref 0–13)
CARDIAC TROPONIN I PNL SERPL HS: 67 NG/L (ref 0–13)
CHLORIDE SERPL-SCNC: 92 MMOL/L (ref 98–107)
CO2 SERPL-SCNC: 25 MMOL/L (ref 21–32)
CREAT SERPL-MCNC: 0.79 MG/DL (ref 0.5–1.05)
DOHLE BOD BLD QL SMEAR: PRESENT
EGFRCR SERPLBLD CKD-EPI 2021: 78 ML/MIN/1.73M*2
EOSINOPHIL # BLD MANUAL: 0 X10*3/UL (ref 0–0.4)
EOSINOPHIL NFR BLD MANUAL: 0 %
ERYTHROCYTE [DISTWIDTH] IN BLOOD BY AUTOMATED COUNT: 13.2 % (ref 11.5–14.5)
GLUCOSE SERPL-MCNC: 301 MG/DL (ref 74–99)
HCT VFR BLD AUTO: 41.2 % (ref 36–46)
HGB BLD-MCNC: 14.3 G/DL (ref 12–16)
IMM GRANULOCYTES # BLD AUTO: 0.14 X10*3/UL (ref 0–0.5)
IMM GRANULOCYTES NFR BLD AUTO: 0.7 % (ref 0–0.9)
LACTATE SERPL-SCNC: 1.5 MMOL/L (ref 0.4–2)
LIPASE SERPL-CCNC: 18 U/L (ref 9–82)
LYMPHOCYTES # BLD MANUAL: 0.21 X10*3/UL (ref 0.8–3)
LYMPHOCYTES NFR BLD MANUAL: 1 %
MCH RBC QN AUTO: 29.7 PG (ref 26–34)
MCHC RBC AUTO-ENTMCNC: 34.7 G/DL (ref 32–36)
MCV RBC AUTO: 86 FL (ref 80–100)
MONOCYTES # BLD MANUAL: 0.64 X10*3/UL (ref 0.05–0.8)
MONOCYTES NFR BLD MANUAL: 3 %
NEUTROPHILS # BLD MANUAL: 20.36 X10*3/UL (ref 1.6–5.5)
NEUTS BAND # BLD MANUAL: 2.76 X10*3/UL (ref 0–0.5)
NEUTS BAND NFR BLD MANUAL: 13 %
NEUTS SEG # BLD MANUAL: 17.6 X10*3/UL (ref 1.6–5)
NEUTS SEG NFR BLD MANUAL: 83 %
NRBC BLD-RTO: 0 /100 WBCS (ref 0–0)
PLATELET # BLD AUTO: 191 X10*3/UL (ref 150–450)
POLYCHROMASIA BLD QL SMEAR: ABNORMAL
POTASSIUM SERPL-SCNC: 3.7 MMOL/L (ref 3.5–5.3)
PROT SERPL-MCNC: 7.4 G/DL (ref 6.4–8.2)
RBC # BLD AUTO: 4.82 X10*6/UL (ref 4–5.2)
RBC MORPH BLD: ABNORMAL
SARS-COV-2 RNA RESP QL NAA+PROBE: NOT DETECTED
SODIUM SERPL-SCNC: 129 MMOL/L (ref 136–145)
TOTAL CELLS COUNTED BLD: 100
WBC # BLD AUTO: 21.2 X10*3/UL (ref 4.4–11.3)

## 2024-06-07 PROCEDURE — 99285 EMERGENCY DEPT VISIT HI MDM: CPT

## 2024-06-07 PROCEDURE — 36415 COLL VENOUS BLD VENIPUNCTURE: CPT | Performed by: STUDENT IN AN ORGANIZED HEALTH CARE EDUCATION/TRAINING PROGRAM

## 2024-06-07 PROCEDURE — 85007 BL SMEAR W/DIFF WBC COUNT: CPT | Performed by: STUDENT IN AN ORGANIZED HEALTH CARE EDUCATION/TRAINING PROGRAM

## 2024-06-07 PROCEDURE — 87040 BLOOD CULTURE FOR BACTERIA: CPT | Mod: PORLAB | Performed by: STUDENT IN AN ORGANIZED HEALTH CARE EDUCATION/TRAINING PROGRAM

## 2024-06-07 PROCEDURE — 80053 COMPREHEN METABOLIC PANEL: CPT | Performed by: STUDENT IN AN ORGANIZED HEALTH CARE EDUCATION/TRAINING PROGRAM

## 2024-06-07 PROCEDURE — 87635 SARS-COV-2 COVID-19 AMP PRB: CPT | Performed by: STUDENT IN AN ORGANIZED HEALTH CARE EDUCATION/TRAINING PROGRAM

## 2024-06-07 PROCEDURE — 93005 ELECTROCARDIOGRAM TRACING: CPT

## 2024-06-07 PROCEDURE — 71046 X-RAY EXAM CHEST 2 VIEWS: CPT

## 2024-06-07 PROCEDURE — 71046 X-RAY EXAM CHEST 2 VIEWS: CPT | Performed by: RADIOLOGY

## 2024-06-07 PROCEDURE — 96361 HYDRATE IV INFUSION ADD-ON: CPT

## 2024-06-07 PROCEDURE — 74177 CT ABD & PELVIS W/CONTRAST: CPT

## 2024-06-07 PROCEDURE — 96365 THER/PROPH/DIAG IV INF INIT: CPT

## 2024-06-07 PROCEDURE — 74177 CT ABD & PELVIS W/CONTRAST: CPT | Performed by: RADIOLOGY

## 2024-06-07 PROCEDURE — 85027 COMPLETE CBC AUTOMATED: CPT | Performed by: STUDENT IN AN ORGANIZED HEALTH CARE EDUCATION/TRAINING PROGRAM

## 2024-06-07 PROCEDURE — 2500000004 HC RX 250 GENERAL PHARMACY W/ HCPCS (ALT 636 FOR OP/ED): Performed by: STUDENT IN AN ORGANIZED HEALTH CARE EDUCATION/TRAINING PROGRAM

## 2024-06-07 PROCEDURE — 83690 ASSAY OF LIPASE: CPT | Performed by: STUDENT IN AN ORGANIZED HEALTH CARE EDUCATION/TRAINING PROGRAM

## 2024-06-07 PROCEDURE — 84484 ASSAY OF TROPONIN QUANT: CPT | Performed by: STUDENT IN AN ORGANIZED HEALTH CARE EDUCATION/TRAINING PROGRAM

## 2024-06-07 PROCEDURE — 2550000001 HC RX 255 CONTRASTS: Performed by: STUDENT IN AN ORGANIZED HEALTH CARE EDUCATION/TRAINING PROGRAM

## 2024-06-07 PROCEDURE — 84484 ASSAY OF TROPONIN QUANT: CPT | Mod: 91 | Performed by: STUDENT IN AN ORGANIZED HEALTH CARE EDUCATION/TRAINING PROGRAM

## 2024-06-07 PROCEDURE — 83605 ASSAY OF LACTIC ACID: CPT | Performed by: STUDENT IN AN ORGANIZED HEALTH CARE EDUCATION/TRAINING PROGRAM

## 2024-06-07 PROCEDURE — 96367 TX/PROPH/DG ADDL SEQ IV INF: CPT

## 2024-06-07 RX ORDER — HEPARIN SODIUM 5000 [USP'U]/ML
5000 INJECTION, SOLUTION INTRAVENOUS; SUBCUTANEOUS EVERY 8 HOURS
Status: DISCONTINUED | OUTPATIENT
Start: 2024-06-07 | End: 2024-06-11 | Stop reason: HOSPADM

## 2024-06-07 RX ORDER — CEFTRIAXONE 2 G/50ML
2 INJECTION, SOLUTION INTRAVENOUS ONCE
Status: COMPLETED | OUTPATIENT
Start: 2024-06-07 | End: 2024-06-07

## 2024-06-07 RX ORDER — ACETAMINOPHEN 325 MG/1
650 TABLET ORAL EVERY 4 HOURS PRN
Status: DISCONTINUED | OUTPATIENT
Start: 2024-06-07 | End: 2024-06-11 | Stop reason: HOSPADM

## 2024-06-07 RX ORDER — DEXTROSE 50 % IN WATER (D50W) INTRAVENOUS SYRINGE
25
Status: DISCONTINUED | OUTPATIENT
Start: 2024-06-07 | End: 2024-06-11 | Stop reason: HOSPADM

## 2024-06-07 RX ORDER — ONDANSETRON HYDROCHLORIDE 2 MG/ML
4 INJECTION, SOLUTION INTRAVENOUS EVERY 6 HOURS PRN
Status: DISCONTINUED | OUTPATIENT
Start: 2024-06-07 | End: 2024-06-11 | Stop reason: HOSPADM

## 2024-06-07 RX ORDER — DEXTROSE 50 % IN WATER (D50W) INTRAVENOUS SYRINGE
12.5
Status: DISCONTINUED | OUTPATIENT
Start: 2024-06-07 | End: 2024-06-11 | Stop reason: HOSPADM

## 2024-06-07 RX ORDER — ACETAMINOPHEN 325 MG/1
975 TABLET ORAL ONCE
Status: COMPLETED | OUTPATIENT
Start: 2024-06-07 | End: 2024-06-07

## 2024-06-07 RX ORDER — CEFTRIAXONE 1 G/50ML
1 INJECTION, SOLUTION INTRAVENOUS EVERY 24 HOURS
Status: DISCONTINUED | OUTPATIENT
Start: 2024-06-08 | End: 2024-06-09

## 2024-06-07 RX ORDER — ATORVASTATIN CALCIUM 20 MG/1
20 TABLET, FILM COATED ORAL NIGHTLY
Status: DISCONTINUED | OUTPATIENT
Start: 2024-06-07 | End: 2024-06-11 | Stop reason: HOSPADM

## 2024-06-07 RX ORDER — CEFTRIAXONE 1 G/50ML
1 INJECTION, SOLUTION INTRAVENOUS EVERY 24 HOURS
Status: DISCONTINUED | OUTPATIENT
Start: 2024-06-07 | End: 2024-06-07

## 2024-06-07 RX ORDER — INSULIN LISPRO 100 [IU]/ML
0-10 INJECTION, SOLUTION INTRAVENOUS; SUBCUTANEOUS
Status: DISCONTINUED | OUTPATIENT
Start: 2024-06-08 | End: 2024-06-11 | Stop reason: HOSPADM

## 2024-06-07 RX ADMIN — SODIUM CHLORIDE 1500 ML: 9 INJECTION, SOLUTION INTRAVENOUS at 21:21

## 2024-06-07 RX ADMIN — DOXYCYCLINE 100 MG: 100 INJECTION, POWDER, LYOPHILIZED, FOR SOLUTION INTRAVENOUS at 23:27

## 2024-06-07 RX ADMIN — IOHEXOL 75 ML: 350 INJECTION, SOLUTION INTRAVENOUS at 22:05

## 2024-06-07 RX ADMIN — ACETAMINOPHEN 975 MG: 325 TABLET ORAL at 21:22

## 2024-06-07 RX ADMIN — CEFTRIAXONE SODIUM 2 G: 2 INJECTION, SOLUTION INTRAVENOUS at 22:39

## 2024-06-07 ASSESSMENT — PAIN - FUNCTIONAL ASSESSMENT: PAIN_FUNCTIONAL_ASSESSMENT: 0-10

## 2024-06-07 ASSESSMENT — PAIN DESCRIPTION - PROGRESSION: CLINICAL_PROGRESSION: NOT CHANGED

## 2024-06-07 ASSESSMENT — PAIN SCALES - GENERAL: PAINLEVEL_OUTOF10: 0 - NO PAIN

## 2024-06-08 LAB
ALBUMIN SERPL BCP-MCNC: 3.1 G/DL (ref 3.4–5)
ANION GAP SERPL CALC-SCNC: 14 MMOL/L (ref 10–20)
APPEARANCE UR: CLEAR
BILIRUB UR STRIP.AUTO-MCNC: NEGATIVE MG/DL
BUN SERPL-MCNC: 16 MG/DL (ref 6–23)
CALCIUM SERPL-MCNC: 9 MG/DL (ref 8.6–10.3)
CARDIAC TROPONIN I PNL SERPL HS: 66 NG/L (ref 0–13)
CHLORIDE SERPL-SCNC: 98 MMOL/L (ref 98–107)
CO2 SERPL-SCNC: 23 MMOL/L (ref 21–32)
COLOR UR: ABNORMAL
CREAT SERPL-MCNC: 0.66 MG/DL (ref 0.5–1.05)
EGFRCR SERPLBLD CKD-EPI 2021: >90 ML/MIN/1.73M*2
ERYTHROCYTE [DISTWIDTH] IN BLOOD BY AUTOMATED COUNT: 13.2 % (ref 11.5–14.5)
GLUCOSE BLD MANUAL STRIP-MCNC: 179 MG/DL (ref 74–99)
GLUCOSE BLD MANUAL STRIP-MCNC: 184 MG/DL (ref 74–99)
GLUCOSE BLD MANUAL STRIP-MCNC: 198 MG/DL (ref 74–99)
GLUCOSE BLD MANUAL STRIP-MCNC: 235 MG/DL (ref 74–99)
GLUCOSE BLD MANUAL STRIP-MCNC: 310 MG/DL (ref 74–99)
GLUCOSE SERPL-MCNC: 240 MG/DL (ref 74–99)
GLUCOSE UR STRIP.AUTO-MCNC: ABNORMAL MG/DL
HCT VFR BLD AUTO: 38.8 % (ref 36–46)
HGB BLD-MCNC: 13.4 G/DL (ref 12–16)
HOLD SPECIMEN: NORMAL
KETONES UR STRIP.AUTO-MCNC: ABNORMAL MG/DL
LEGIONELLA AG UR QL: POSITIVE
LEUKOCYTE ESTERASE UR QL STRIP.AUTO: NEGATIVE
MAGNESIUM SERPL-MCNC: 1.82 MG/DL (ref 1.6–2.4)
MCH RBC QN AUTO: 29.8 PG (ref 26–34)
MCHC RBC AUTO-ENTMCNC: 34.5 G/DL (ref 32–36)
MCV RBC AUTO: 86 FL (ref 80–100)
MUCOUS THREADS #/AREA URNS AUTO: NORMAL /LPF
NITRITE UR QL STRIP.AUTO: NEGATIVE
NRBC BLD-RTO: 0 /100 WBCS (ref 0–0)
PH UR STRIP.AUTO: 6 [PH]
PHOSPHATE SERPL-MCNC: 2.2 MG/DL (ref 2.5–4.9)
PLATELET # BLD AUTO: 172 X10*3/UL (ref 150–450)
POTASSIUM SERPL-SCNC: 3.3 MMOL/L (ref 3.5–5.3)
PROT UR STRIP.AUTO-MCNC: ABNORMAL MG/DL
RBC # BLD AUTO: 4.5 X10*6/UL (ref 4–5.2)
RBC # UR STRIP.AUTO: ABNORMAL /UL
RBC #/AREA URNS AUTO: NORMAL /HPF
S PNEUM AG UR QL: NEGATIVE
SODIUM SERPL-SCNC: 132 MMOL/L (ref 136–145)
SP GR UR STRIP.AUTO: 1.02
SQUAMOUS #/AREA URNS AUTO: NORMAL /HPF
UROBILINOGEN UR STRIP.AUTO-MCNC: NORMAL MG/DL
WBC # BLD AUTO: 21.7 X10*3/UL (ref 4.4–11.3)
WBC #/AREA URNS AUTO: NORMAL /HPF

## 2024-06-08 PROCEDURE — 80069 RENAL FUNCTION PANEL: CPT | Performed by: INTERNAL MEDICINE

## 2024-06-08 PROCEDURE — 99223 1ST HOSP IP/OBS HIGH 75: CPT | Performed by: INTERNAL MEDICINE

## 2024-06-08 PROCEDURE — 83735 ASSAY OF MAGNESIUM: CPT | Performed by: INTERNAL MEDICINE

## 2024-06-08 PROCEDURE — 2500000001 HC RX 250 WO HCPCS SELF ADMINISTERED DRUGS (ALT 637 FOR MEDICARE OP): Performed by: INTERNAL MEDICINE

## 2024-06-08 PROCEDURE — 2060000001 HC INTERMEDIATE ICU ROOM DAILY

## 2024-06-08 PROCEDURE — 2500000002 HC RX 250 W HCPCS SELF ADMINISTERED DRUGS (ALT 637 FOR MEDICARE OP, ALT 636 FOR OP/ED): Performed by: INTERNAL MEDICINE

## 2024-06-08 PROCEDURE — 96366 THER/PROPH/DIAG IV INF ADDON: CPT

## 2024-06-08 PROCEDURE — 36415 COLL VENOUS BLD VENIPUNCTURE: CPT | Performed by: INTERNAL MEDICINE

## 2024-06-08 PROCEDURE — 81001 URINALYSIS AUTO W/SCOPE: CPT | Performed by: STUDENT IN AN ORGANIZED HEALTH CARE EDUCATION/TRAINING PROGRAM

## 2024-06-08 PROCEDURE — 87449 NOS EACH ORGANISM AG IA: CPT | Mod: PORLAB | Performed by: INTERNAL MEDICINE

## 2024-06-08 PROCEDURE — 99222 1ST HOSP IP/OBS MODERATE 55: CPT | Performed by: INTERNAL MEDICINE

## 2024-06-08 PROCEDURE — 87899 AGENT NOS ASSAY W/OPTIC: CPT | Mod: PORLAB | Performed by: INTERNAL MEDICINE

## 2024-06-08 PROCEDURE — 2500000004 HC RX 250 GENERAL PHARMACY W/ HCPCS (ALT 636 FOR OP/ED): Performed by: INTERNAL MEDICINE

## 2024-06-08 PROCEDURE — 84484 ASSAY OF TROPONIN QUANT: CPT | Performed by: INTERNAL MEDICINE

## 2024-06-08 PROCEDURE — 96372 THER/PROPH/DIAG INJ SC/IM: CPT | Performed by: INTERNAL MEDICINE

## 2024-06-08 PROCEDURE — 82947 ASSAY GLUCOSE BLOOD QUANT: CPT | Mod: 91

## 2024-06-08 PROCEDURE — 82947 ASSAY GLUCOSE BLOOD QUANT: CPT

## 2024-06-08 PROCEDURE — 85027 COMPLETE CBC AUTOMATED: CPT | Performed by: INTERNAL MEDICINE

## 2024-06-08 RX ORDER — ASPIRIN 81 MG/1
81 TABLET ORAL DAILY
COMMUNITY

## 2024-06-08 RX ORDER — CHOLECALCIFEROL (VITAMIN D3) 50 MCG
2000 TABLET ORAL DAILY
COMMUNITY

## 2024-06-08 RX ORDER — INSULIN GLARGINE 100 [IU]/ML
10 INJECTION, SOLUTION SUBCUTANEOUS NIGHTLY
Status: DISCONTINUED | OUTPATIENT
Start: 2024-06-08 | End: 2024-06-09

## 2024-06-08 RX ADMIN — INSULIN GLARGINE 10 UNITS: 100 INJECTION, SOLUTION SUBCUTANEOUS at 23:45

## 2024-06-08 RX ADMIN — HEPARIN SODIUM 5000 UNITS: 5000 INJECTION INTRAVENOUS; SUBCUTANEOUS at 00:18

## 2024-06-08 RX ADMIN — ACETAMINOPHEN 650 MG: 325 TABLET ORAL at 04:49

## 2024-06-08 RX ADMIN — CEFTRIAXONE SODIUM 1 G: 1 INJECTION, SOLUTION INTRAVENOUS at 22:49

## 2024-06-08 RX ADMIN — HEPARIN SODIUM 5000 UNITS: 5000 INJECTION INTRAVENOUS; SUBCUTANEOUS at 15:01

## 2024-06-08 RX ADMIN — INSULIN LISPRO 2 UNITS: 100 INJECTION, SOLUTION INTRAVENOUS; SUBCUTANEOUS at 12:19

## 2024-06-08 RX ADMIN — ATORVASTATIN CALCIUM 20 MG: 40 TABLET, FILM COATED ORAL at 00:18

## 2024-06-08 RX ADMIN — ACETAMINOPHEN 650 MG: 325 TABLET ORAL at 21:41

## 2024-06-08 RX ADMIN — INSULIN LISPRO 4 UNITS: 100 INJECTION, SOLUTION INTRAVENOUS; SUBCUTANEOUS at 08:22

## 2024-06-08 RX ADMIN — ATORVASTATIN CALCIUM 20 MG: 40 TABLET, FILM COATED ORAL at 21:41

## 2024-06-08 RX ADMIN — DOXYCYCLINE 100 MG: 100 INJECTION, POWDER, LYOPHILIZED, FOR SOLUTION INTRAVENOUS at 11:17

## 2024-06-08 RX ADMIN — ACETAMINOPHEN 650 MG: 325 TABLET ORAL at 11:00

## 2024-06-08 RX ADMIN — HEPARIN SODIUM 5000 UNITS: 5000 INJECTION INTRAVENOUS; SUBCUTANEOUS at 08:13

## 2024-06-08 RX ADMIN — ACETAMINOPHEN 650 MG: 325 TABLET ORAL at 15:38

## 2024-06-08 SDOH — SOCIAL STABILITY: SOCIAL INSECURITY: HAVE YOU HAD THOUGHTS OF HARMING ANYONE ELSE?: NO

## 2024-06-08 SDOH — SOCIAL STABILITY: SOCIAL INSECURITY: WERE YOU ABLE TO COMPLETE ALL THE BEHAVIORAL HEALTH SCREENINGS?: YES

## 2024-06-08 ASSESSMENT — ENCOUNTER SYMPTOMS
FEVER: 0
LIGHT-HEADEDNESS: 1
CHEST TIGHTNESS: 0
JOINT SWELLING: 0
WOUND: 0
FACIAL ASYMMETRY: 0
NAUSEA: 0
SEIZURES: 0
NERVOUS/ANXIOUS: 0
WEAKNESS: 1
SHORTNESS OF BREATH: 1
ARTHRALGIAS: 0
ABDOMINAL PAIN: 1
VOMITING: 0
DYSURIA: 0
NECK PAIN: 0
CONFUSION: 0
BACK PAIN: 0
FACIAL SWELLING: 0
MYALGIAS: 1
DECREASED CONCENTRATION: 0
RHINORRHEA: 0
FREQUENCY: 0
FATIGUE: 1
TROUBLE SWALLOWING: 0
CONSTIPATION: 0
CHILLS: 0
DIZZINESS: 0
HEADACHES: 0
WHEEZING: 0
PALPITATIONS: 0
HEMATURIA: 0
POLYDIPSIA: 1
APPETITE CHANGE: 1
SINUS PRESSURE: 0
NUMBNESS: 0
ACTIVITY CHANGE: 1
BLOOD IN STOOL: 0
COUGH: 1
FLANK PAIN: 0
DIAPHORESIS: 0
NECK STIFFNESS: 0
UNEXPECTED WEIGHT CHANGE: 0
ABDOMINAL DISTENTION: 0
SORE THROAT: 0
VOICE CHANGE: 0
TREMORS: 0
ABDOMINAL PAIN: 0
DIARRHEA: 0
SPEECH DIFFICULTY: 0
SINUS PAIN: 0

## 2024-06-08 ASSESSMENT — LIFESTYLE VARIABLES
SUBSTANCE_ABUSE_PAST_12_MONTHS: NO
AUDIT-C TOTAL SCORE: 0
HOW OFTEN DO YOU HAVE A DRINK CONTAINING ALCOHOL: NEVER
PRESCIPTION_ABUSE_PAST_12_MONTHS: NO
HOW MANY STANDARD DRINKS CONTAINING ALCOHOL DO YOU HAVE ON A TYPICAL DAY: PATIENT DOES NOT DRINK
SKIP TO QUESTIONS 9-10: 1
HOW OFTEN DO YOU HAVE 6 OR MORE DRINKS ON ONE OCCASION: NEVER
AUDIT-C TOTAL SCORE: 0

## 2024-06-08 ASSESSMENT — PATIENT HEALTH QUESTIONNAIRE - PHQ9
2. FEELING DOWN, DEPRESSED OR HOPELESS: NOT AT ALL
1. LITTLE INTEREST OR PLEASURE IN DOING THINGS: NOT AT ALL
SUM OF ALL RESPONSES TO PHQ9 QUESTIONS 1 & 2: 0

## 2024-06-08 ASSESSMENT — COGNITIVE AND FUNCTIONAL STATUS - GENERAL
DRESSING REGULAR LOWER BODY CLOTHING: A LITTLE
DAILY ACTIVITIY SCORE: 24
DRESSING REGULAR UPPER BODY CLOTHING: A LITTLE
STANDING UP FROM CHAIR USING ARMS: A LITTLE
TOILETING: A LITTLE
PATIENT BASELINE BEDBOUND: NO
MOBILITY SCORE: 24
HELP NEEDED FOR BATHING: A LITTLE
MOBILITY SCORE: 19
DAILY ACTIVITIY SCORE: 20
MOVING TO AND FROM BED TO CHAIR: A LITTLE
WALKING IN HOSPITAL ROOM: A LITTLE
CLIMB 3 TO 5 STEPS WITH RAILING: A LOT

## 2024-06-08 ASSESSMENT — ACTIVITIES OF DAILY LIVING (ADL)
TOILETING: INDEPENDENT
DRESSING YOURSELF: INDEPENDENT
HEARING - LEFT EAR: FUNCTIONAL
WALKS IN HOME: INDEPENDENT
BATHING: INDEPENDENT
PATIENT'S MEMORY ADEQUATE TO SAFELY COMPLETE DAILY ACTIVITIES?: YES
HEARING - RIGHT EAR: FUNCTIONAL
GROOMING: INDEPENDENT
ADEQUATE_TO_COMPLETE_ADL: YES
BATHING: INDEPENDENT
ASSISTIVE_DEVICE: EYEGLASSES
ADEQUATE_TO_COMPLETE_ADL: YES
PATIENT'S MEMORY ADEQUATE TO SAFELY COMPLETE DAILY ACTIVITIES?: YES
GROOMING: INDEPENDENT
TOILETING: INDEPENDENT
FEEDING YOURSELF: INDEPENDENT
JUDGMENT_ADEQUATE_SAFELY_COMPLETE_DAILY_ACTIVITIES: YES
FEEDING YOURSELF: INDEPENDENT
ASSISTIVE_DEVICE: EYEGLASSES
JUDGMENT_ADEQUATE_SAFELY_COMPLETE_DAILY_ACTIVITIES: YES
HEARING - RIGHT EAR: FUNCTIONAL
DRESSING YOURSELF: INDEPENDENT
LACK_OF_TRANSPORTATION: NO
HEARING - LEFT EAR: FUNCTIONAL
WALKS IN HOME: INDEPENDENT

## 2024-06-08 ASSESSMENT — PAIN SCALES - GENERAL
PAINLEVEL_OUTOF10: 0 - NO PAIN
PAINLEVEL_OUTOF10: 3
PAINLEVEL_OUTOF10: 0 - NO PAIN
PAINLEVEL_OUTOF10: 0 - NO PAIN

## 2024-06-08 ASSESSMENT — PAIN DESCRIPTION - DESCRIPTORS: DESCRIPTORS: ACHING

## 2024-06-08 ASSESSMENT — PAIN - FUNCTIONAL ASSESSMENT
PAIN_FUNCTIONAL_ASSESSMENT: 0-10

## 2024-06-08 NOTE — CONSULTS
Inpatient consult to Endocrinology  Consult performed by: Sherly Moreno MD  Consult ordered by: Javon Vázquez DO  Reason for consult: Uncontrolled DM2, intolerant of metformin and other med per patient, hyperglycemia (non-emergent consult)          Reason For Consult  Uncontrolled DM2, intolerant of metformin and other med per patient, hyperglycemia (non-emergent consult)     History Of Present Illness  Johnny Motta is a 73 y.o. female presenting with increasing generalized weakness and several falls.  She states that she just feels generally unwell and has had very oral intake.  She was found to be hemodynamically stable.  SpO2 was 90% on room air.  Patient's tachycardia improved with IV fluids.  There was radiographic suggestion of pneumonia.  CT scan of the abdomen revealed a 1.8 cm left adrenal nodule which is incompletely characterized on monophasic study.    The patient states that she has been diabetic for at least 5 years.  Her disease is managed by Dr. Gao at [facility].  Her last visit was 1 month ago.  She was prescribed metformin and her PCP wanted to increase the dosage.  However, she states that metformin gives her cramps and she has stopped it altogether over the last 5 days.    Her appetite is poor.  She has had increased thirst but admits to drinking water.  She was previously experiencing nocturia x 2-3 but this is not happening currently.  She denies any blurred vision.     Past Medical History  She has a past medical history of Diabetes (Multi) and Tobacco abuse.    She has no past medical history of Adverse effect of anesthesia, Arthritis, Asthma (HHS-HCC), Awareness under anesthesia, Cancer (Multi), CHF (congestive heart failure) (Multi), COPD (chronic obstructive pulmonary disease) (Multi), Coronary artery disease, Delayed emergence from general anesthesia, Disease of thyroid gland, Hard to intubate, History of transfusion, Hypertension, Malignant hyperthermia, PONV  "(postoperative nausea and vomiting), Pseudocholinesterase deficiency, Spinal headache, or Stroke (Multi).    Surgical History  She has a past surgical history that includes Cholecystectomy (09/12/2018).     Social History  She reports that she has been smoking cigarettes. She started smoking about 25 years ago. She has a 12.6 pack-year smoking history. She has been exposed to tobacco smoke. She has never used smokeless tobacco. She reports that she does not drink alcohol and does not use drugs.    Family History  Family History   Problem Relation Name Age of Onset    Heart attack Mother          Allergies  Penicillin g    Review of Systems   Eyes:  Negative for visual disturbance.   Gastrointestinal:  Positive for abdominal pain.   Endocrine: Positive for polydipsia.   All other systems reviewed and are negative.       Physical Exam  Vitals and nursing note reviewed.   Constitutional:       General: She is not in acute distress.     Appearance: Normal appearance. She is normal weight.   HENT:      Head: Normocephalic and atraumatic.      Nose: Nose normal.      Mouth/Throat:      Mouth: Mucous membranes are moist.   Eyes:      Extraocular Movements: Extraocular movements intact.   Cardiovascular:      Rate and Rhythm: Normal rate and regular rhythm.   Pulmonary:      Effort: Pulmonary effort is normal.      Breath sounds: Normal breath sounds.   Musculoskeletal:         General: Normal range of motion.   Skin:     General: Skin is warm.   Neurological:      Mental Status: She is alert and oriented to person, place, and time.   Psychiatric:         Mood and Affect: Mood normal.          Last Recorded Vitals  Blood pressure 106/65, pulse 97, temperature 37.2 °C (99 °F), resp. rate 20, height 1.575 m (5' 2\"), weight 49 kg (108 lb), SpO2 98%.    Relevant Results  Scheduled medications  atorvastatin, 20 mg, oral, Nightly  cefTRIAXone, 1 g, intravenous, q24h  doxycycline, 100 mg, intravenous, q12h  heparin (porcine), " 5,000 Units, subcutaneous, q8h  insulin lispro, 0-10 Units, subcutaneous, TID      Continuous medications     PRN medications  PRN medications: acetaminophen, dextrose, dextrose, glucagon, glucagon, ondansetron   Results for orders placed or performed during the hospital encounter of 06/07/24 (from the past 24 hour(s))   CBC and Auto Differential   Result Value Ref Range    WBC 21.2 (H) 4.4 - 11.3 x10*3/uL    nRBC 0.0 0.0 - 0.0 /100 WBCs    RBC 4.82 4.00 - 5.20 x10*6/uL    Hemoglobin 14.3 12.0 - 16.0 g/dL    Hematocrit 41.2 36.0 - 46.0 %    MCV 86 80 - 100 fL    MCH 29.7 26.0 - 34.0 pg    MCHC 34.7 32.0 - 36.0 g/dL    RDW 13.2 11.5 - 14.5 %    Platelets 191 150 - 450 x10*3/uL    Immature Granulocytes %, Automated 0.7 0.0 - 0.9 %    Immature Granulocytes Absolute, Automated 0.14 0.00 - 0.50 x10*3/uL   Comprehensive Metabolic Panel   Result Value Ref Range    Glucose 301 (H) 74 - 99 mg/dL    Sodium 129 (L) 136 - 145 mmol/L    Potassium 3.7 3.5 - 5.3 mmol/L    Chloride 92 (L) 98 - 107 mmol/L    Bicarbonate 25 21 - 32 mmol/L    Anion Gap 16 10 - 20 mmol/L    Urea Nitrogen 20 6 - 23 mg/dL    Creatinine 0.79 0.50 - 1.05 mg/dL    eGFR 78 >60 mL/min/1.73m*2    Calcium 9.8 8.6 - 10.3 mg/dL    Albumin 3.6 3.4 - 5.0 g/dL    Alkaline Phosphatase 72 33 - 136 U/L    Total Protein 7.4 6.4 - 8.2 g/dL    AST 31 9 - 39 U/L    Bilirubin, Total 0.6 0.0 - 1.2 mg/dL    ALT 19 7 - 45 U/L   Lactate   Result Value Ref Range    Lactate 1.5 0.4 - 2.0 mmol/L   Blood Culture    Specimen: Peripheral Venipuncture; Blood culture   Result Value Ref Range    Blood Culture Loaded on Instrument - Culture in progress    Blood Culture    Specimen: Peripheral Venipuncture; Blood culture   Result Value Ref Range    Blood Culture Loaded on Instrument - Culture in progress    Lipase   Result Value Ref Range    Lipase 18 9 - 82 U/L   Troponin I, High Sensitivity, Initial   Result Value Ref Range    Troponin I, High Sensitivity 60 (HH) 0 - 13 ng/L   Manual  Differential   Result Value Ref Range    Neutrophils %, Manual 83.0 40.0 - 80.0 %    Bands %, Manual 13.0 0.0 - 5.0 %    Lymphocytes %, Manual 1.0 13.0 - 44.0 %    Monocytes %, Manual 3.0 2.0 - 10.0 %    Eosinophils %, Manual 0.0 0.0 - 6.0 %    Basophils %, Manual 0.0 0.0 - 2.0 %    Seg Neutrophils Absolute, Manual 17.60 (H) 1.60 - 5.00 x10*3/uL    Bands Absolute, Manual 2.76 (H) 0.00 - 0.50 x10*3/uL    Lymphocytes Absolute, Manual 0.21 (L) 0.80 - 3.00 x10*3/uL    Monocytes Absolute, Manual 0.64 0.05 - 0.80 x10*3/uL    Eosinophils Absolute, Manual 0.00 0.00 - 0.40 x10*3/uL    Basophils Absolute, Manual 0.00 0.00 - 0.10 x10*3/uL    Total Cells Counted 100     Neutrophils Absolute, Manual 20.36 (H) 1.60 - 5.50 x10*3/uL    RBC Morphology See Below     Polychromasia Mild     Dohle Bodies Present    Sars-CoV-2 PCR   Result Value Ref Range    Coronavirus 2019, PCR Not Detected Not Detected   Troponin, High Sensitivity, 1 Hour   Result Value Ref Range    Troponin I, High Sensitivity 67 (HH) 0 - 13 ng/L   Troponin I, High Sensitivity   Result Value Ref Range    Troponin I, High Sensitivity 66 (HH) 0 - 13 ng/L   Urinalysis with Reflex Culture and Microscopic   Result Value Ref Range    Color, Urine Light-Yellow Light-Yellow, Yellow, Dark-Yellow    Appearance, Urine Clear Clear    Specific Gravity, Urine 1.020 1.005 - 1.035    pH, Urine 6.0 5.0, 5.5, 6.0, 6.5, 7.0, 7.5, 8.0    Protein, Urine 70 (1+) (A) NEGATIVE, 10 (TRACE), 20 (TRACE) mg/dL    Glucose, Urine 1000 (4+) (A) Normal mg/dL    Blood, Urine 0.1 (1+) (A) NEGATIVE    Ketones, Urine 40 (2+) (A) NEGATIVE mg/dL    Bilirubin, Urine NEGATIVE NEGATIVE    Urobilinogen, Urine Normal Normal mg/dL    Nitrite, Urine NEGATIVE NEGATIVE    Leukocyte Esterase, Urine NEGATIVE NEGATIVE   Urinalysis Microscopic   Result Value Ref Range    WBC, Urine 1-5 1-5, NONE /HPF    RBC, Urine 3-5 NONE, 1-2, 3-5 /HPF    Squamous Epithelial Cells, Urine 1-9 (SPARSE) Reference range not  established. /HPF    Mucus, Urine FEW Reference range not established. /LPF   CBC   Result Value Ref Range    WBC 21.7 (H) 4.4 - 11.3 x10*3/uL    nRBC 0.0 0.0 - 0.0 /100 WBCs    RBC 4.50 4.00 - 5.20 x10*6/uL    Hemoglobin 13.4 12.0 - 16.0 g/dL    Hematocrit 38.8 36.0 - 46.0 %    MCV 86 80 - 100 fL    MCH 29.8 26.0 - 34.0 pg    MCHC 34.5 32.0 - 36.0 g/dL    RDW 13.2 11.5 - 14.5 %    Platelets 172 150 - 450 x10*3/uL   Renal Function Panel   Result Value Ref Range    Glucose 240 (H) 74 - 99 mg/dL    Sodium 132 (L) 136 - 145 mmol/L    Potassium 3.3 (L) 3.5 - 5.3 mmol/L    Chloride 98 98 - 107 mmol/L    Bicarbonate 23 21 - 32 mmol/L    Anion Gap 14 10 - 20 mmol/L    Urea Nitrogen 16 6 - 23 mg/dL    Creatinine 0.66 0.50 - 1.05 mg/dL    eGFR >90 >60 mL/min/1.73m*2    Calcium 9.0 8.6 - 10.3 mg/dL    Phosphorus 2.2 (L) 2.5 - 4.9 mg/dL    Albumin 3.1 (L) 3.4 - 5.0 g/dL   Magnesium   Result Value Ref Range    Magnesium 1.82 1.60 - 2.40 mg/dL   POCT GLUCOSE   Result Value Ref Range    POCT Glucose 235 (H) 74 - 99 mg/dL   POCT GLUCOSE   Result Value Ref Range    POCT Glucose 179 (H) 74 - 99 mg/dL      CT abdomen pelvis w IV contrast    Result Date: 6/7/2024  Interpreted By:  Juan Jose Montelongo, STUDY: CT ABDOMEN PELVIS W IV CONTRAST;  6/7/2024 10:19 pm   INDICATION: Signs/Symptoms:abdominal pain.   COMPARISON: None.   ACCESSION NUMBER(S): [id number]   ORDERING CLINICIAN: FIDELIA GRUBBS   TECHNIQUE: Axial CT images of the abdomen and pelvis with coronal and sagittal reconstructed images obtained after intravenous administration of 75 mL of Omnipaque 350   FINDINGS: LOWER CHEST: Dense consolidative opacity in the left lower lobe concerning for developing pneumonia.   ABDOMEN:   LIVER: Normal morphology. Liver is hypodense relative to the spleen suggestive of steatosis. Subcentimeter hypodense focus in the hepatic dome is too small to characterize. BILE DUCTS: Normal caliber. GALLBLADDER: Status post cholecystectomy. PANCREAS:  Within normal limits. SPLEEN: Within normal limits. ADRENALS: There is a 1.8 cm left adrenal nodule which is incompletely characterized on this monophasic study. Right adrenal glands within normal limits. KIDNEYS and URETERS: Symmetric renal enhancement. No hydronephrosis or perinephric fluid collection. Several hypodense left renal lesions demonstrate fluid attenuation, largest measuring up to 1.3 cm compatible with cysts. Additional subcentimeter hypodense foci are too small to characterize.   VESSELS:  Calcific atherosclerosis of the aortoiliac and mesenteric vessels. No aortic aneurysm. RETROPERITONEUM: No pathologically enlarged retroperitoneal lymph nodes.   PELVIS:   REPRODUCTIVE ORGANS: Uterus is present. No adnexal mass. BLADDER: Within normal limits.   BOWEL: Stomach is under distended with apparent wall thickening. Visualized loops of bowel are without evidence for obstruction. Moderate stool burden. Scattered colonic diverticula without evidence for acute diverticulitis. No pneumatosis or portal venous gas. Normal appendix. PERITONEUM: No ascites or free air, no fluid collection.   ABDOMINAL WALL: Within normal limits. BONES: Multilevel degenerative changes of the spine.       No acute abdominal or pelvic process.   Dense consolidative opacity in the left lower lobe concerning for developing pneumonia. Posttreatment follow-up is recommended to ensure complete resolution.   There is a 1.8 cm left adrenal nodule which is incompletely characterized on this monophasic study. This can be further evaluated with nonemergent adrenal washout CT or MRI.   Scattered colonic diverticula without evidence for acute diverticulitis.   Additional findings as described above.   MACRO: None   Signed by: Juan Jose Montelongo 6/7/2024 11:04 PM Dictation workstation:   [id number]    XR chest 2 views    Result Date: 6/7/2024  Interpreted By:  Juan Jose Montelongo, STUDY: XR CHEST 2 VIEWS;  6/7/2024 10:06 pm   INDICATION:  Signs/Symptoms:cough.   COMPARISON: Chest x-ray 04/11/2024   ACCESSION NUMBER(S): [id number]   ORDERING CLINICIAN: FIDELIA GRUBBS   FINDINGS:     CARDIOMEDIASTINAL SILHOUETTE: Cardiomediastinal silhouette is normal in size and configuration.   LUNGS: Retrocardiac airspace opacity concerning for developing pneumonia. Right lung is clear. No sizeable effusion or pneumothorax.   ABDOMEN: Multiple surgical clips noted in the upper abdomen.   BONES: Multilevel degenerative changes of the spine.       There is retrocardiac airspace opacity concerning for developing pneumonia. Posttreatment follow-up is recommended to ensure complete resolution and exclude underlying lesion.   MACRO: None   Signed by: Juan Jose Monetlongo 6/7/2024 10:41 PM Dictation workstation:   [id number]       Assessment/Plan     IMPRESSION  Poorly controlled tyep II DM  A1C of 11.6%    Recommendations:  To commence Lantus 10 units subcutaneous bedtime   Continue insulin correction scale TID AC   Diabetic diet as tolerated   Accu-Cheks ACHS    Hypoglycemic protocol   Counseled that metformin therapy will not be resumed upon discharge given GI symptoms  Counseled that the goal A1C should be 7% or less  Counseled glycemic control is warranted to prevent microvascular complications  Will continue to follow    Thank you for the courtesy of this consult     Sherly Moreno MD

## 2024-06-08 NOTE — PROGRESS NOTES
Johnny Motta is a 73 y.o. female admitted for Sepsis due to pneumonia (Multi). Pharmacy reviewed the patient's [id number] medications and allergies for accuracy.    The list below reflects the PTA list prior to pharmacy medication history. A summary a changes to the PTA medication list has been listed below. Please review each medication in order reconciliation for additional clarification and justification.    Source of information:  T2P    Medications added:  Asa 81mg every day   Vitamin D3 50mcg qd    Medications modified:  Metformin ER 500mg bid --> 500mg tid- pt stopped 3-4 days ago due to stomach/GI upset    Medications to be removed:    Medications of concern:      Prior to Admission Medications   Prescriptions Last Dose Informant Patient Reported? Taking?   atorvastatin (Lipitor) 20 mg tablet   Yes No   Sig: Take 1 tablet (20 mg) by mouth once daily.   metFORMIN  mg 24 hr tablet   Yes No   Sig: Take 1 tablet (500 mg) by mouth every 12 hours.      Facility-Administered Medications: None       Mariposa Hernandez

## 2024-06-08 NOTE — ED NOTES
Pt arrives to ED via ambulance from home with c/o generalized weakness and fall.    Code Status:  Full Code    HPI     Chief Complaint   Patient presents with    Weakness, Gen    Fall     -loc, denies hitting head, -blood thinners. Has been falling the past 4 days, also states she isn't eating/drinking       /71 (BP Location: Left arm, Patient Position: Sitting)   Pulse 93   Temp 37.2 °C (99 °F)   Resp 20   Wt 49 kg (108 lb)   SpO2 98%     [place] Coma Scale Score: 15      LDA:   Peripheral IV 06/07/24 20 G Left;Dorsal Forearm (Active)   Placement Date/Time: 06/07/24 2050   Hand Hygiene Completed: Yes  Size (Gauge): 20 G  Orientation: Left;Dorsal  Location: Forearm  Site Prep: Chlorhexidine    Number of days: 0        BACKGROUND  Past Medical History:   Diagnosis Date    Diabetes (Multi)     Tobacco abuse      Past Surgical History:   Procedure Laterality Date    CHOLECYSTECTOMY  09/12/2018    Cholecystectomy     No current facility-administered medications on file prior to encounter.     Current Outpatient Medications on File Prior to Encounter   Medication Sig Dispense Refill    aspirin 81 mg EC tablet Take 1 tablet (81 mg) by mouth once daily.      atorvastatin (Lipitor) 20 mg tablet Take 1 tablet (20 mg) by mouth once daily.      cholecalciferol (Vitamin D3) 50 MCG (2000 UT) tablet Take 1 tablet (2,000 Units) by mouth once daily.      metFORMIN  mg 24 hr tablet Take 1 tablet (500 mg) by mouth 3 times a day.          ASSESSMENT  ED Course as of 06/08/24 1226   Fri Jun 07, 2024 2126 LEUKOCYTES (10*3/UL) IN BLOOD BY AUTOMATED COUNT, Chinese(!): 21.2 [CF]   2215 EKG on my read shows sinus rhythm at a rate of 111 bpm.  Right bundle physiology no ST elevations [CF]   2303 IMPRESSION:  There is retrocardiac airspace opacity concerning for developing  pneumonia. Posttreatment follow-up is recommended to ensure complete  resolution and exclude underlying lesion.   [CF]   2312 IMPRESSION:  No acute  abdominal or pelvic process.      Dense consolidative opacity in the left lower lobe concerning for  developing pneumonia. Posttreatment follow-up is recommended to  ensure complete resolution.      There is a 1.8 cm left adrenal nodule which is incompletely  characterized on this monophasic study. This can be further evaluated  with nonemergent adrenal washout CT or MRI.      Scattered colonic diverticula without evidence for acute  diverticulitis.      Additional findings as described above.   [CF]      ED Course User Index  [CF] Anastasia Rodriguez MD         Diagnoses as of 06/08/24 1226   Sepsis due to pneumonia (Multi)       Medications Currently Running:        Medications Given:  ED Medication Administration from 06/07/2024 2009 to 06/08/2024 1226         Date/Time Order Dose Route Action Action by     06/07/2024 2121 EDT sodium chloride 0.9 % bolus 1,470 mL 1,500 mL intravenous New Bag Meli,      06/07/2024 2122 EDT acetaminophen (Tylenol) tablet 975 mg 975 mg oral Given Meli,      06/07/2024 2205 EDT iohexol (OMNIPaque) 350 mg iodine/mL solution 75 mL 75 mL intravenous Given ALBERT Brasher     06/07/2024 2239 EDT cefTRIAXone (Rocephin) 2 g in dextrose 5% in water (D5W) 50 mL 2 g intravenous New Bag Quinton, R     06/07/2024 2309 EDT cefTRIAXone (Rocephin) 2 g in dextrose 5% in water (D5W) 50 mL 0 g intravenous Stopped [facility] R     06/07/2024 2321 EDT sodium chloride 0.9 % bolus 1,470 mL 0 mL intravenous Stopped [facility] R     06/07/2024 2327 EDT doxycycline (Vibramycin) in sodium chloride 0.9%  mg 100 mg intravenous New Bag Quinton, R     06/07/2024 2350 EDT cefTRIAXone (Rocephin) IVPB 1 g 1 g intravenous Not Given Mihaly, K     06/08/2024 0016 EDT acetaminophen (Tylenol) tablet 650 mg 650 mg oral Not Given Mihaly, K     06/08/2024 0018 EDT atorvastatin (Lipitor) tablet 20 mg 20 mg oral Given Mihaly, K     06/08/2024 0018 EDT heparin (porcine) injection 5,000 Units 5,000 Units subcutaneous Given Mihaly,  K     06/08/2024 0027 EDT doxycycline (Vibramycin) in sodium chloride 0.9%  mg 0 mg intravenous Stopped Mihaly, K     06/08/2024 0449 EDT acetaminophen (Tylenol) tablet 650 mg 650 [facility]     06/08/2024 0813 EDT heparin (porcine) injection 5,000 Units 5,000 Units subcutaneous Given Guillaume, D     06/08/2024 0822 EDT insulin lispro (HumaLOG) injection 0-10 Units 4 Units subcutaneous Given Guillaume, D     06/08/2024 1100 EDT acetaminophen (Tylenol) tablet 650 mg 650 mg oral Given Guillaume, D     06/08/2024 1117 EDT doxycycline (Vibramycin) in sodium chloride 0.9%  mg 100 mg intravenous New Bag Guillaume, D     06/08/2024 1217 EDT doxycycline (Vibramycin) in sodium chloride 0.9%  mg 0 mg intravenous Stopped Guillaume, D     06/08/2024 1219 EDT insulin lispro (HumaLOG) injection 0-10 Units 2 Units subcutaneous Given Guillaume, D                 RESULTS    Imaging:  CT abdomen pelvis w IV contrast   Final Result   No acute abdominal or pelvic process.        Dense consolidative opacity in the left lower lobe concerning for   developing pneumonia. Posttreatment follow-up is recommended to   ensure complete resolution.        There is a 1.8 cm left adrenal nodule which is incompletely   characterized on this monophasic study. This can be further evaluated   with nonemergent adrenal washout CT or MRI.        Scattered colonic diverticula without evidence for acute   diverticulitis.        Additional findings as described above.        MACRO:   None        Signed by: Juan Jose Montelongo 6/7/2024 11:04 PM   Dictation workstation:   [id number]      XR chest 2 views   Final Result   There is retrocardiac airspace opacity concerning for developing   pneumonia. Posttreatment follow-up is recommended to ensure complete   resolution and exclude underlying lesion.        MACRO:   None        Signed by: Juan Jose Montelongo 6/7/2024 10:41 PM   Dictation workstation:   [id number]         }  Labs ::99  Abnormal  Labs Reviewed   CBC WITH AUTO DIFFERENTIAL - Abnormal; Notable for the following components:       Result Value    WBC 21.2 (*)     All other components within normal limits    Narrative:     The previously reported component Neutrophils % is no longer being reported.  The previously reported component Lymphocytes % is no longer being reported.  The previously reported component Monocytes % is no longer being reported.  The previously                   reported component Eosinophils % is no longer being reported.  The previously reported component Basophils % is no longer being reported.  The previously reported component Absolute Neutrophils is no longer being reported.  The previously reported                   component Absolute Lymphocytes is no longer being reported.  The previously reported component Absolute Monocytes is no longer being reported.  The previously reported component Absolute Eosinophils is no longer being reported.  The previously reported                   component Absolute Basophils is no longer being reported.   COMPREHENSIVE METABOLIC PANEL - Abnormal; Notable for the following components:    Glucose 301 (*)     Sodium 129 (*)     Chloride 92 (*)     All other components within normal limits   URINALYSIS WITH REFLEX CULTURE AND MICROSCOPIC - Abnormal; Notable for the following components:    Protein, Urine 70 (1+) (*)     Glucose, Urine 1000 (4+) (*)     Blood, Urine 0.1 (1+) (*)     Ketones, Urine 40 (2+) (*)     All other components within normal limits   SERIAL TROPONIN-INITIAL - Abnormal; Notable for the following components:    Troponin I, High Sensitivity 60 (*)     All other components within normal limits    Narrative:     Less than 99th percentile of normal range cutoff-                  Female and children under 18 years old <14 ng/L; Male <21 ng/L: Negative                  Repeat testing should be performed if clinically indicated.                                     Female and  children under 18 years old 14-50 ng/L; Male 21-50 ng/L:                  Consistent with possible cardiac damage and possible increased clinical                   risk. Serial measurements may help to assess extent of myocardial damage.                                     >50 ng/L: Consistent with cardiac damage, increased clinical risk and                  myocardial infarction. Serial measurements may help assess extent of                   myocardial damage.                                      NOTE: Children less than 1 year old may have higher baseline troponin                   levels and results should be interpreted in conjunction with the overall                   clinical context.                                     NOTE: Troponin I testing is performed using a different                   testing methodology at [facility] than at other                   [facility]. Direct result comparisons should only                   be made within the same method.   SERIAL TROPONIN, 1 HOUR - Abnormal; Notable for the following components:    Troponin I, High Sensitivity 67 (*)     All other components within normal limits    Narrative:     Less than 99th percentile of normal range cutoff-                  Female and children under 18 years old <14 ng/L; Male <21 ng/L: Negative                  Repeat testing should be performed if clinically indicated.                                     Female and children under 18 years old 14-50 ng/L; Male 21-50 ng/L:                  Consistent with possible cardiac damage and possible increased clinical                   risk. Serial measurements may help to assess extent of myocardial damage.                                     >50 ng/L: Consistent with cardiac damage, increased clinical risk and                  myocardial infarction. Serial measurements may help assess extent of                   myocardial damage.                                      NOTE:  Children less than 1 year old may have higher baseline troponin                   levels and results should be interpreted in conjunction with the overall                   clinical context.                                     NOTE: Troponin I testing is performed using a different                   testing methodology at [facility] than at other                   [facility]. Direct result comparisons should only                   be made within the same method.   TROPONIN I, HIGH SENSITIVITY - Abnormal; Notable for the following components:    Troponin I, High Sensitivity 66 (*)     All other components within normal limits    Narrative:     Less than 99th percentile of normal range cutoff-                  Female and children under 18 years old <14 ng/L; Male <21 ng/L: Negative                  Repeat testing should be performed if clinically indicated.                                     Female and children under 18 years old 14-50 ng/L; Male 21-50 ng/L:                  Consistent with possible cardiac damage and possible increased clinical                   risk. Serial measurements may help to assess extent of myocardial damage.                                     >50 ng/L: Consistent with cardiac damage, increased clinical risk and                  myocardial infarction. Serial measurements may help assess extent of                   myocardial damage.                                      NOTE: Children less than 1 year old may have higher baseline troponin                   levels and results should be interpreted in conjunction with the overall                   clinical context.                                     NOTE: Troponin I testing is performed using a different                   testing methodology at [facility] than at other                   [facility]. Direct result comparisons should only                   be made within the same method.   CBC - Abnormal;  Notable for the following components:    WBC 21.7 (*)     All other components within normal limits   RENAL FUNCTION PANEL - Abnormal; Notable for the following components:    Glucose 240 (*)     Sodium 132 (*)     Potassium 3.3 (*)     Phosphorus 2.2 (*)     Albumin 3.1 (*)     All other components within normal limits   POCT GLUCOSE - Abnormal; Notable for the following components:    POCT Glucose 235 (*)     All other components within normal limits   POCT GLUCOSE - Abnormal; Notable for the following components:    POCT Glucose 179 (*)     All other components within normal limits   MANUAL DIFFERENTIAL - Abnormal; Notable for the following components:    Seg Neutrophils Absolute, Manual 17.60 (*)     Bands Absolute, Manual 2.76 (*)     Lymphocytes Absolute, Manual 0.21 (*)     Neutrophils Absolute, Manual 20.36 (*)     All other components within normal limits                 Umm Guillaume RN  06/08/24 [id number]

## 2024-06-08 NOTE — PROGRESS NOTES
Patient admitted post midnight. She reported fever and chills. She noted mild cough.   She is aware of plans to continue abx.   Discussed treatment plan with patient and daughter.  Review of CT scan showed PNA.  Also there is adrenal 1.8 cm left adrenal nodule on CT. Will reeval with Adrenal CT vs MRI. Will hold off on CT adrenal due to recent contrast CT.    Continue current management.    Renal function in am.       Rest of management as noted in today's H and P

## 2024-06-08 NOTE — H&P
[facility] - GENERAL MEDICINE HISTORY AND PHYSICAL    History Obtained From: Patient and Daughter at the bedside    Recent Admission:  A.  04/11/24 till 04/12/24 = NIDDM2 w/ hyperglycemia, Chest Pain with unremarkable nuclear stress test and echo noting diastolic dysfunction, tobacco use disorder    History Of Present Illness:  Johnny Motta is a 73 y.o.  female with a past medical history significant for HLD and uncontrolled DM2.  Patient over the past 4 to 5 days has had increasing generalized weakness and several falls.  She states that she just feels generally unwell and has had very poor p.o. intake of food/water and has become increasingly weaker.  She does live at home alone and her daughter does frequently visit as well as several other family members and children.  Earlier this evening she fell and was having significant difficulties and unable to walk and had to crawl around.  She had also noted some slight congestion which has been getting worse over the past several days.  She denies any recent sick contacts, chemical/environmental exposures, changes in dietary habits or any recent traumatic event/falls other than noted above.  She denies any fevers, chills, night sweats, vision changes, auditory changes, change in taste/smell, loss of bowel/bladder control, loss consciousness, dizziness, vertigo, syncope, seizure-like activity, chest pain, palpitations, wheezing, hemoptysis, hematemesis, abdominal pain, nausea vomiting, diarrhea, constipation, dysuria, dyschezia, hematochezia, hematuria or any lateralizing motor/sensory deficits other than noted above.     ED Course (Summary):   Vitals on presentation: Temperature of 38.9 °C, heart rate of 121, respirations 16, blood pressure 124/68, pulse ox of 90% on room air that did improve to 93% on room air at rest and with discussion.  Patient's tachycardia did steadily improve with IV fluids and antibiotics and is now less than 100  EKG  noted sinus rhythm, RBBB, wandering and minimal ST elevations in inferior leads, rate of 93, , , QTc of 443 and is similar to that of April 2024  WBC = 21.2 -->  Hgb/Hct = 14.3/41.2 -->  Seg Neutro = 17.6  Bands = 2.76  Blood Culture x2 = pending  HSTI = 60 --> 67 --> 66  Ua was unremarkable for infectious changes but noted slight ketones and 4+ glucose, protein  XR chest: There is a retrocardiac airspace opacity concerning for developing pneumonia  CT abdomen/pelvis with IV contrast: Full report as noted below, no acute abdominal/pelvic process, dense consolidative opacity in the left lower lobe concerning for developing pneumonia, 1.8 cm left adrenal nodule which is incompletely characterized on monophasic study with recommendations for nonemergent adrenal washout CT or MRI, scattered colonic diverticula  Patient was given 175 mg of p.o. Tylenol, 2 g of IV Rocephin, 100 mg of IV doxycycline and about 1500 cc of normal saline per sepsis protocol in the ED.    ED Course (From Provider):  ED Course as of 06/08/24 0309   Fri Jun 07, 2024   2126 LEUKOCYTES (10*3/UL) IN BLOOD BY AUTOMATED COUNT, Maltese(!): 21.2 [CF]   2215 EKG on my read shows sinus rhythm at a rate of 111 bpm.  Right bundle physiology no ST elevations [CF]   2303 IMPRESSION:  There is retrocardiac airspace opacity concerning for developing  pneumonia. Posttreatment follow-up is recommended to ensure complete  resolution and exclude underlying lesion.   [CF]   2312 IMPRESSION:  No acute abdominal or pelvic process.      Dense consolidative opacity in the left lower lobe concerning for  developing pneumonia. Posttreatment follow-up is recommended to  ensure complete resolution.      There is a 1.8 cm left adrenal nodule which is incompletely  characterized on this monophasic study. This can be further evaluated  with nonemergent adrenal washout CT or MRI.      Scattered colonic diverticula without evidence for acute  diverticulitis.       Additional findings as described above.   [CF]      ED Course User Index  [CF] Anastasia Rodriguez MD         Diagnoses as of 06/08/24 0309   Sepsis due to pneumonia (Multi)     Relevant Results  Results for orders placed or performed during the hospital encounter of 06/07/24 (from the past 24 hour(s))   CBC and Auto Differential   Result Value Ref Range    WBC 21.2 (H) 4.4 - 11.3 x10*3/uL    nRBC 0.0 0.0 - 0.0 /100 WBCs    RBC 4.82 4.00 - 5.20 x10*6/uL    Hemoglobin 14.3 12.0 - 16.0 g/dL    Hematocrit 41.2 36.0 - 46.0 %    MCV 86 80 - 100 fL    MCH 29.7 26.0 - 34.0 pg    MCHC 34.7 32.0 - 36.0 g/dL    RDW 13.2 11.5 - 14.5 %    Platelets 191 150 - 450 x10*3/uL    Immature Granulocytes %, Automated 0.7 0.0 - 0.9 %    Immature Granulocytes Absolute, Automated 0.14 0.00 - 0.50 x10*3/uL   Comprehensive Metabolic Panel   Result Value Ref Range    Glucose 301 (H) 74 - 99 mg/dL    Sodium 129 (L) 136 - 145 mmol/L    Potassium 3.7 3.5 - 5.3 mmol/L    Chloride 92 (L) 98 - 107 mmol/L    Bicarbonate 25 21 - 32 mmol/L    Anion Gap 16 10 - 20 mmol/L    Urea Nitrogen 20 6 - 23 mg/dL    Creatinine 0.79 0.50 - 1.05 mg/dL    eGFR 78 >60 mL/min/1.73m*2    Calcium 9.8 8.6 - 10.3 mg/dL    Albumin 3.6 3.4 - 5.0 g/dL    Alkaline Phosphatase 72 33 - 136 U/L    Total Protein 7.4 6.4 - 8.2 g/dL    AST 31 9 - 39 U/L    Bilirubin, Total 0.6 0.0 - 1.2 mg/dL    ALT 19 7 - 45 U/L   Lactate   Result Value Ref Range    Lactate 1.5 0.4 - 2.0 mmol/L   Blood Culture    Specimen: Peripheral Venipuncture; Blood culture   Result Value Ref Range    Blood Culture Loaded on Instrument - Culture in progress    Blood Culture    Specimen: Peripheral Venipuncture; Blood culture   Result Value Ref Range    Blood Culture Loaded on Instrument - Culture in progress    Lipase   Result Value Ref Range    Lipase 18 9 - 82 U/L   Troponin I, High Sensitivity, Initial   Result Value Ref Range    Troponin I, High Sensitivity 60 (HH) 0 - 13 ng/L   Manual Differential    Result Value Ref Range    Neutrophils %, Manual 83.0 40.0 - 80.0 %    Bands %, Manual 13.0 0.0 - 5.0 %    Lymphocytes %, Manual 1.0 13.0 - 44.0 %    Monocytes %, Manual 3.0 2.0 - 10.0 %    Eosinophils %, Manual 0.0 0.0 - 6.0 %    Basophils %, Manual 0.0 0.0 - 2.0 %    Seg Neutrophils Absolute, Manual 17.60 (H) 1.60 - 5.00 x10*3/uL    Bands Absolute, Manual 2.76 (H) 0.00 - 0.50 x10*3/uL    Lymphocytes Absolute, Manual 0.21 (L) 0.80 - 3.00 x10*3/uL    Monocytes Absolute, Manual 0.64 0.05 - 0.80 x10*3/uL    Eosinophils Absolute, Manual 0.00 0.00 - 0.40 x10*3/uL    Basophils Absolute, Manual 0.00 0.00 - 0.10 x10*3/uL    Total Cells Counted 100     Neutrophils Absolute, Manual 20.36 (H) 1.60 - 5.50 x10*3/uL    RBC Morphology See Below     Polychromasia Mild     Dohle Bodies Present    Sars-CoV-2 PCR   Result Value Ref Range    Coronavirus 2019, PCR Not Detected Not Detected   Troponin, High Sensitivity, 1 Hour   Result Value Ref Range    Troponin I, High Sensitivity 67 (HH) 0 - 13 ng/L   Troponin I, High Sensitivity   Result Value Ref Range    Troponin I, High Sensitivity 66 (HH) 0 - 13 ng/L   Urinalysis with Reflex Culture and Microscopic   Result Value Ref Range    Color, Urine Light-Yellow Light-Yellow, Yellow, Dark-Yellow    Appearance, Urine Clear Clear    Specific Gravity, Urine 1.020 1.005 - 1.035    pH, Urine 6.0 5.0, 5.5, 6.0, 6.5, 7.0, 7.5, 8.0    Protein, Urine 70 (1+) (A) NEGATIVE, 10 (TRACE), 20 (TRACE) mg/dL    Glucose, Urine 1000 (4+) (A) Normal mg/dL    Blood, Urine 0.1 (1+) (A) NEGATIVE    Ketones, Urine 40 (2+) (A) NEGATIVE mg/dL    Bilirubin, Urine NEGATIVE NEGATIVE    Urobilinogen, Urine Normal Normal mg/dL    Nitrite, Urine NEGATIVE NEGATIVE    Leukocyte Esterase, Urine NEGATIVE NEGATIVE   Urinalysis Microscopic   Result Value Ref Range    WBC, Urine 1-5 1-5, NONE /HPF    RBC, Urine 3-5 NONE, 1-2, 3-5 /HPF    Squamous Epithelial Cells, Urine 1-9 (SPARSE) Reference range not established. /HPF     Mucus, Urine FEW Reference range not established. /LPF      CT abdomen pelvis w IV contrast    Result Date: 6/7/2024  Interpreted By:  Juan Jose Montelongo, STUDY: CT ABDOMEN PELVIS W IV CONTRAST;  6/7/2024 10:19 pm   INDICATION: Signs/Symptoms:abdominal pain.   COMPARISON: None.   ACCESSION NUMBER(S): [id number]   ORDERING CLINICIAN: FIDELIA GRUBBS   TECHNIQUE: Axial CT images of the abdomen and pelvis with coronal and sagittal reconstructed images obtained after intravenous administration of 75 mL of Omnipaque 350   FINDINGS: LOWER CHEST: Dense consolidative opacity in the left lower lobe concerning for developing pneumonia.   ABDOMEN:   LIVER: Normal morphology. Liver is hypodense relative to the spleen suggestive of steatosis. Subcentimeter hypodense focus in the hepatic dome is too small to characterize. BILE DUCTS: Normal caliber. GALLBLADDER: Status post cholecystectomy. PANCREAS: Within normal limits. SPLEEN: Within normal limits. ADRENALS: There is a 1.8 cm left adrenal nodule which is incompletely characterized on this monophasic study. Right adrenal glands within normal limits. KIDNEYS and URETERS: Symmetric renal enhancement. No hydronephrosis or perinephric fluid collection. Several hypodense left renal lesions demonstrate fluid attenuation, largest measuring up to 1.3 cm compatible with cysts. Additional subcentimeter hypodense foci are too small to characterize.   VESSELS:  Calcific atherosclerosis of the aortoiliac and mesenteric vessels. No aortic aneurysm. RETROPERITONEUM: No pathologically enlarged retroperitoneal lymph nodes.   PELVIS:   REPRODUCTIVE ORGANS: Uterus is present. No adnexal mass. BLADDER: Within normal limits.   BOWEL: Stomach is under distended with apparent wall thickening. Visualized loops of bowel are without evidence for obstruction. Moderate stool burden. Scattered colonic diverticula without evidence for acute diverticulitis. No pneumatosis or portal venous gas. Normal  appendix. PERITONEUM: No ascites or free air, no fluid collection.   ABDOMINAL WALL: Within normal limits. BONES: Multilevel degenerative changes of the spine.       No acute abdominal or pelvic process.   Dense consolidative opacity in the left lower lobe concerning for developing pneumonia. Posttreatment follow-up is recommended to ensure complete resolution.   There is a 1.8 cm left adrenal nodule which is incompletely characterized on this monophasic study. This can be further evaluated with nonemergent adrenal washout CT or MRI.   Scattered colonic diverticula without evidence for acute diverticulitis.   Additional findings as described above.   MACRO: None   Signed by: Juan Jose Montelongo 6/7/2024 11:04 PM Dictation workstation:   [id number]    XR chest 2 views    Result Date: 6/7/2024  Interpreted By:  Juan Jose Montelongo, STUDY: XR CHEST 2 VIEWS;  6/7/2024 10:06 pm   INDICATION: Signs/Symptoms:cough.   COMPARISON: Chest x-ray 04/11/2024   ACCESSION NUMBER(S): [id number]   ORDERING CLINICIAN: FIDELIA GRUBBS   FINDINGS:     CARDIOMEDIASTINAL SILHOUETTE: Cardiomediastinal silhouette is normal in size and configuration.   LUNGS: Retrocardiac airspace opacity concerning for developing pneumonia. Right lung is clear. No sizeable effusion or pneumothorax.   ABDOMEN: Multiple surgical clips noted in the upper abdomen.   BONES: Multilevel degenerative changes of the spine.       There is retrocardiac airspace opacity concerning for developing pneumonia. Posttreatment follow-up is recommended to ensure complete resolution and exclude underlying lesion.   MACRO: None   Signed by: Juan Jose Montelongo 6/7/2024 10:41 PM Dictation workstation:   [id number]    Scheduled medications:  atorvastatin, 20 mg, oral, Nightly  cefTRIAXone, 1 g, intravenous, q24h  doxycycline, 100 mg, intravenous, q12h  heparin (porcine), 5,000 Units, subcutaneous, q8h  insulin lispro, 0-10 Units, subcutaneous, TID      Continuous medications:     PRN  medications:  PRN medications: acetaminophen, dextrose, dextrose, glucagon, glucagon, ondansetron     Past Medical History  She has a past medical history of Diabetes (Multi) and Tobacco abuse.    She has no past medical history of Adverse effect of anesthesia, Arthritis, Asthma ([facility]), Awareness under anesthesia, Cancer (Multi), CHF (congestive heart failure) (Multi), COPD (chronic obstructive pulmonary disease) (Multi), Coronary artery disease, Delayed emergence from general anesthesia, Disease of thyroid gland, Hard to intubate, History of transfusion, Hypertension, Malignant hyperthermia, PONV (postoperative nausea and vomiting), Pseudocholinesterase deficiency, Spinal headache, or Stroke (Multi).    Surgical History  She has a past surgical history that includes Cholecystectomy (09/12/2018).     Social History  She reports that she has been smoking cigarettes. She started smoking about 25 years ago. She has a 12.6 pack-year smoking history. She has been exposed to tobacco smoke. She has never used smokeless tobacco. She reports that she does not drink alcohol and does not use drugs.    Family History  Family History   Problem Relation Name Age of Onset    Heart attack Mother        Allergies  Penicillin g    Code Status  Full Code     Review of Systems   Constitutional:  Positive for activity change, appetite change and fatigue. Negative for chills, diaphoresis, fever and unexpected weight change.   HENT:  Positive for congestion. Negative for facial swelling, hearing loss, mouth sores, nosebleeds, postnasal drip, rhinorrhea, sinus pressure, sinus pain, sneezing, sore throat, tinnitus, trouble swallowing and voice change.    Respiratory:  Positive for cough and shortness of breath. Negative for chest tightness and wheezing.    Cardiovascular:  Negative for chest pain, palpitations and leg swelling.   Gastrointestinal:  Negative for abdominal distention, abdominal pain, blood in stool, constipation, diarrhea,  nausea and vomiting.   Genitourinary:  Negative for decreased urine volume, dysuria, flank pain, frequency, hematuria and urgency.   Musculoskeletal:  Positive for gait problem and myalgias. Negative for arthralgias, back pain, joint swelling, neck pain and neck stiffness.   Skin:  Negative for wound.   Neurological:  Positive for weakness and light-headedness. Negative for dizziness, tremors, seizures, syncope, facial asymmetry, speech difficulty, numbness and headaches.   Psychiatric/Behavioral:  Negative for confusion and decreased concentration. The patient is not nervous/anxious.    All other systems reviewed and are negative.      Last Recorded Vitals  BP 98/60   Pulse 89   Temp (!) 38.9 °C (102.1 °F)   Resp 17   Wt 49 kg (108 lb)   SpO2 (!) 93%      Physical Exam  Vitals and nursing note reviewed.   Constitutional:       General: She is not in acute distress.     Appearance: Normal appearance. She is not ill-appearing or diaphoretic.   HENT:      Head: Normocephalic and atraumatic.      Nose: Nose normal.      Mouth/Throat:      Mouth: Mucous membranes are dry.      Pharynx: Oropharynx is clear.   Eyes:      Extraocular Movements: Extraocular movements intact.      Conjunctiva/sclera: Conjunctivae normal.      Pupils: Pupils are equal, round, and reactive to light.   Neck:      Vascular: No carotid bruit.   Cardiovascular:      Rate and Rhythm: Normal rate and regular rhythm.      Pulses: Normal pulses.      Heart sounds: No murmur heard.  Pulmonary:      Effort: Pulmonary effort is normal. No respiratory distress.      Breath sounds: Wheezing and rhonchi present.      Comments: Diminished breath sounds throughout all lung fields with very patchy expiratory wheezing noted throughout both faint and there is notable rhonchi in bilateral lower lung fields left definitely greater than the right, no increased work of breathing at rest or with discussion  Chest:      Chest wall: No tenderness.   Abdominal:       General: Abdomen is flat. There is no distension.      Palpations: Abdomen is soft. There is no mass.      Tenderness: There is no abdominal tenderness. There is no guarding or rebound.   Musculoskeletal:         General: No tenderness, deformity or signs of injury.      Cervical back: Normal range of motion and neck supple. No rigidity or tenderness.      Right lower leg: No edema.      Left lower leg: No edema.      Comments: Range of motion/strength appears globally diminished likely around a 4-5 out of 5   Skin:     General: Skin is warm and dry.      Capillary Refill: Capillary refill takes less than 2 seconds.      Findings: No bruising, erythema, lesion or rash.   Neurological:      General: No focal deficit present.      Mental Status: She is alert and oriented to person, place, and time.      Cranial Nerves: No cranial nerve deficit.      Sensory: No sensory deficit.      Motor: No weakness.      Coordination: Coordination normal.      Gait: Gait normal.      Comments: AOx4, finger-nose/heel-to-shin intact, range of motion/strength is globally diminished likely around 4-5 out of 5, fluent speech, no grossly evident lateralizing motor/sensory deficits, cranial nerves appear intact   Psychiatric:         Mood and Affect: Mood normal.         Behavior: Behavior normal.         Thought Content: Thought content normal.         Judgment: Judgment normal.       Assessment/Plan   Principal Problem:    Sepsis due to pneumonia (Multi)      Sepsis without Shock likely in Setting of Pneumonia   Leukocytosis  Bandemia   -WBC = 21.2   -based on labs, vital signs and infectious etiologies   -s/p ~1500cc IVFs in the ED per sepsis protocol  -Continued IV abx as noted below  -trend fever curve accordingly     Pneumonia   -suspect community acquired etiology  -Blood Cultures x2 = pending  -Sputum Culture = pending   -Streptococcus pneumoniae/Legionella Urinary Antigens = pending   -Continued on IV Rocephin and oral  Doxycycline     Elevated Troponin Level  -suspect demand in setting of above  -Telemetry Monitoring   -Admission and testing reviewed from 04/2024  -HSTI = 60 --> 67 --> 66 -->  -nonischemic EKG appearing similar to prior hospitalization    Hyponatremia  -corrects to about 134 for hyperglycemia  -already received IVFs in setting of above  -trend accordingly    NIDDM2 w/ Hyperglycemia  -Glucose = 301  -Hgb A1c = 11.6 (04/11/24)  -uncontrolled due to noncompliance  -states she does not tolerate metformin due to nausea and occasional vomiting/diarrhea  -open to following closely in the outpatient setting with PCP and/or Endocrinology  -Endocrinology Consult appreciated (patient notes intolerance of other medications but cannot remember exactly what)  -suspect may need insulin as well  -continued with ISS AC and adjust accordingly     HLD  -Continued on statin     Tobacco Use Disorder  -Declined nicotine supplementation   -Smoking cessation education >3 minutes provided, she wishes to quit but does not have a plan and will discuss modalities with her primary care team    Deconditioning / Ambulatory Dysfunction / Falls  -likely in setting of above      Javon Vázquez DO    Dragon dictation software was used to dictate this note and thus there may be minor errors in translation/transcription including garbled speech or misspellings. Please contact for clarification if needed.

## 2024-06-08 NOTE — ED PROVIDER NOTES
HPI   Chief Complaint   Patient presents with    Weakness, Gen    Fall     -loc, denies hitting head, -blood thinners. Has been falling the past 4 days, also states she isn't eating/drinking       This is a 73-year-old female past medical history of tobacco abuse, type 2 diabetes who presents emergency department for weakness.  Speech states for the past 5 days she has been feeling unwell drinking very little.  She is also having fever.  She denies any nausea, vomiting, diarrhea or constipation.  She reports no coughing.  She is alert and oriented x 4.  She does live alone she states that she has been unable to walk and she has been having to crawl due to her weakness.                          [place] Coma Scale Score: 15                  Patient History   Past Medical History:   Diagnosis Date    Diabetes (Multi)     Tobacco abuse      Past Surgical History:   Procedure Laterality Date    CHOLECYSTECTOMY  09/12/2018    Cholecystectomy     Family History   Problem Relation Name Age of Onset    Heart attack Mother       Social History     Tobacco Use    Smoking status: Every Day     Current packs/day: 0.50     Average packs/day: 0.5 packs/day for 25.2 years (12.6 ttl pk-yrs)     Types: Cigarettes     Start date: 4/12/1999     Passive exposure: Past    Smokeless tobacco: Never   Vaping Use    Vaping status: Never Used   Substance Use Topics    Alcohol use: Never    Drug use: Never       Physical Exam   ED Triage Vitals [06/07/24 2021]   Temperature Heart Rate Respirations BP   (!) 38.9 °C (102.1 °F) (!) 121 16 124/68      Pulse Ox Temp src Heart Rate Source Patient Position   (!) 90 % -- -- --      BP Location FiO2 (%)     -- --       Physical Exam  Constitutional:       General: She is not in acute distress.  HENT:      Head: Normocephalic and atraumatic.      Right Ear: Tympanic membrane normal.      Left Ear: Tympanic membrane normal.      Mouth/Throat:      Mouth: Mucous membranes are moist.   Eyes:       Extraocular Movements: Extraocular movements intact.      Conjunctiva/sclera: Conjunctivae normal.      Pupils: Pupils are equal, round, and reactive to light.   Cardiovascular:      Rate and Rhythm: Normal rate and regular rhythm.      Heart sounds: No murmur heard.  Pulmonary:      Effort: Pulmonary effort is normal. No respiratory distress.      Breath sounds: Normal breath sounds. No stridor. No wheezing or rales.   Abdominal:      General: Bowel sounds are normal. There is no distension.      Tenderness: There is no abdominal tenderness. There is no guarding or rebound.   Musculoskeletal:         General: No swelling, tenderness or deformity. Normal range of motion.   Skin:     General: Skin is warm and dry.      Coloration: Skin is not jaundiced.      Findings: No bruising or lesion.   Neurological:      General: No focal deficit present.      Mental Status: She is alert and oriented to person, place, and time. Mental status is at baseline.      Cranial Nerves: No cranial nerve deficit.      Motor: No weakness.   Psychiatric:         Mood and Affect: Mood normal.       Labs Reviewed   BLOOD CULTURE   BLOOD CULTURE   CBC WITH AUTO DIFFERENTIAL   COMPREHENSIVE METABOLIC PANEL   LACTATE   URINALYSIS WITH REFLEX CULTURE AND MICROSCOPIC    Narrative:     The following orders were created for panel order Urinalysis with Reflex Culture and Microscopic.  Procedure                               Abnormality         Status                     ---------                               -----------         ------                     Urinalysis with Reflex C...[[id number]]                                                 Extra Urine Gray Tube[[id number]]                                                         Please view results for these tests on the individual orders.   URINALYSIS WITH REFLEX CULTURE AND MICROSCOPIC   EXTRA URINE GRAY TUBE   TROPONIN SERIES- (INITIAL, 1 HR)    Narrative:     The following orders were created  for panel order Troponin I Series, High Sensitivity (0, 1 HR).  Procedure                               Abnormality         Status                     ---------                               -----------         ------                     Troponin I, High Sensiti...[[id number]]                                                   Please view results for these tests on the individual orders.   LIPASE   SARS-COV-2 PCR   SERIAL TROPONIN-INITIAL     XR chest 2 views    (Results Pending)   CT abdomen pelvis w IV contrast    (Results Pending)       ED Course & [facility]   ED Course as of 06/07/24 2343 Fri Jun 07, 2024 2126 LEUKOCYTES (10*3/UL) IN BLOOD BY AUTOMATED COUNT, SERGIO(!): 21.2 [CF]   2215 EKG on my read shows sinus rhythm at a rate of 111 bpm.  Right bundle physiology no ST elevations [CF]   2303 IMPRESSION:  There is retrocardiac airspace opacity concerning for developing  pneumonia. Posttreatment follow-up is recommended to ensure complete  resolution and exclude underlying lesion.   [CF]   2312 IMPRESSION:  No acute abdominal or pelvic process.      Dense consolidative opacity in the left lower lobe concerning for  developing pneumonia. Posttreatment follow-up is recommended to  ensure complete resolution.      There is a 1.8 cm left adrenal nodule which is incompletely  characterized on this monophasic study. This can be further evaluated  with nonemergent adrenal washout CT or MRI.      Scattered colonic diverticula without evidence for acute  diverticulitis.      Additional findings as described above.   [CF]      ED Course User Index  [CF] Anastasia Rodriguez MD       Medical Decision Making  This is a 73-year-old female with a past medical history of tobacco abuse, type 2 diabetes who presents to the emergency department for fever, and weakness.  Patient is tachycardic when here but otherwise hemodynamically stable.  Patient has no back pain on palpation no focal logical deficits on my exam pulses are  intact.  Her leukocytosis is 21 suspect patient is most likely septic as opposed to weakness electrolytes or spinal concerns no signs of cauda equina or spinal epidural abscess.  X-ray does show pneumonia left lower lobe she was given ceftriaxone and doxycycline.  COVID is negative.  Troponins are elevated but flat at 60 and 67 she is not having chest pain her EKG is nonischemic suspect this is most likely demand since she was very tachycardic when she first arrived.  CT of her abdomen showed no acute abnormality except for the left lower lobe pneumonia.  Her urine is still pending.  At this time patient was admitted for sepsis secondary to pneumonia.  She is not requiring oxygen at this time.        Procedure  Critical Care    Performed by: Anastasia Rodriguez MD  Authorized by: Anastasia Rodriguez MD    Critical care provider statement:     Critical care time (minutes):  20    Critical care time was exclusive of:  Separately billable procedures and treating other patients    Critical care was necessary to treat or prevent imminent or life-threatening deterioration of the following conditions:  Sepsis and cardiac failure    Critical care was time spent personally by me on the following activities:  Blood draw for specimens, development of treatment plan with patient or surrogate, discussions with consultants, evaluation of patient's response to treatment, examination of patient, ordering and performing treatments and interventions, ordering and review of laboratory studies, ordering and review of radiographic studies, pulse oximetry, re-evaluation of patient's condition and review of old charts    Care discussed with: admitting provider         Anastasia Rodriguez MD  06/07/24 [id number]

## 2024-06-08 NOTE — CARE PLAN
The patient's goals for the shift include      The clinical goals for the shift include safety with ambulation      Problem: Pain - Adult  Goal: Verbalizes/displays adequate comfort level or baseline comfort level  Outcome: Progressing     Problem: Safety - Adult  Goal: Free from fall injury  Outcome: Progressing     Problem: Discharge Planning  Goal: Discharge to home or other facility with appropriate resources  Outcome: Progressing     Problem: Chronic Conditions and Co-morbidities  Goal: Patient's chronic conditions and co-morbidity symptoms are monitored and maintained or improved  Outcome: Progressing     Problem: Diabetes  Goal: Achieve decreasing blood glucose levels by end of shift  Outcome: Progressing  Goal: Increase stability of blood glucose readings by end of shift  Outcome: Progressing  Goal: Decrease in ketones present in urine by end of shift  Outcome: Progressing  Goal: Maintain electrolyte levels within acceptable range throughout shift  Outcome: Progressing  Goal: Maintain glucose levels >70mg/dl to <250mg/dl throughout shift  Outcome: Progressing  Goal: No changes in neurological exam by end of shift  Outcome: Progressing  Goal: Learn about and adhere to nutrition recommendations by end of shift  Outcome: Progressing  Goal: Vital signs within normal range for age by end of shift  Outcome: Progressing  Goal: Increase self care and/or family involovement by end of shift  Outcome: Progressing  Goal: Receive DSME education by end of shift  Outcome: Progressing     Problem: Fall/Injury  Goal: Not fall by end of shift  Outcome: Progressing  Goal: Be free from injury by end of the shift  Outcome: Progressing  Goal: Verbalize understanding of personal risk factors for fall in the hospital  Outcome: Progressing  Goal: Verbalize understanding of risk factor reduction measures to prevent injury from fall in the home  Outcome: Progressing  Goal: Use assistive devices by end of the shift  Outcome:  Progressing  Goal: Pace activities to prevent fatigue by end of the shift  Outcome: Progressing

## 2024-06-09 ENCOUNTER — APPOINTMENT (OUTPATIENT)
Dept: CARDIOLOGY | Facility: HOSPITAL | Age: 74
DRG: 871 | End: 2024-06-09
Payer: COMMERCIAL

## 2024-06-09 LAB
ALBUMIN SERPL BCP-MCNC: 2.9 G/DL (ref 3.4–5)
ANION GAP SERPL CALC-SCNC: 14 MMOL/L (ref 10–20)
BUN SERPL-MCNC: 16 MG/DL (ref 6–23)
CALCIUM SERPL-MCNC: 9.2 MG/DL (ref 8.6–10.3)
CHLORIDE SERPL-SCNC: 99 MMOL/L (ref 98–107)
CO2 SERPL-SCNC: 22 MMOL/L (ref 21–32)
CREAT SERPL-MCNC: 0.58 MG/DL (ref 0.5–1.05)
EGFRCR SERPLBLD CKD-EPI 2021: >90 ML/MIN/1.73M*2
ERYTHROCYTE [DISTWIDTH] IN BLOOD BY AUTOMATED COUNT: 13.3 % (ref 11.5–14.5)
GLUCOSE BLD MANUAL STRIP-MCNC: 164 MG/DL (ref 74–99)
GLUCOSE BLD MANUAL STRIP-MCNC: 171 MG/DL (ref 74–99)
GLUCOSE BLD MANUAL STRIP-MCNC: 199 MG/DL (ref 74–99)
GLUCOSE BLD MANUAL STRIP-MCNC: 244 MG/DL (ref 74–99)
GLUCOSE SERPL-MCNC: 224 MG/DL (ref 74–99)
HCT VFR BLD AUTO: 36.6 % (ref 36–46)
HGB BLD-MCNC: 12.7 G/DL (ref 12–16)
MCH RBC QN AUTO: 29.7 PG (ref 26–34)
MCHC RBC AUTO-ENTMCNC: 34.7 G/DL (ref 32–36)
MCV RBC AUTO: 86 FL (ref 80–100)
NRBC BLD-RTO: 0 /100 WBCS (ref 0–0)
PHOSPHATE SERPL-MCNC: 1.9 MG/DL (ref 2.5–4.9)
PLATELET # BLD AUTO: 164 X10*3/UL (ref 150–450)
POTASSIUM SERPL-SCNC: 3.5 MMOL/L (ref 3.5–5.3)
RBC # BLD AUTO: 4.27 X10*6/UL (ref 4–5.2)
SODIUM SERPL-SCNC: 131 MMOL/L (ref 136–145)
TSH SERPL-ACNC: 0.54 MIU/L (ref 0.44–3.98)
WBC # BLD AUTO: 17.9 X10*3/UL (ref 4.4–11.3)

## 2024-06-09 PROCEDURE — 2060000001 HC INTERMEDIATE ICU ROOM DAILY

## 2024-06-09 PROCEDURE — 97161 PT EVAL LOW COMPLEX 20 MIN: CPT | Mod: GP

## 2024-06-09 PROCEDURE — 36415 COLL VENOUS BLD VENIPUNCTURE: CPT | Performed by: INTERNAL MEDICINE

## 2024-06-09 PROCEDURE — 97116 GAIT TRAINING THERAPY: CPT | Mod: GP

## 2024-06-09 PROCEDURE — 84443 ASSAY THYROID STIM HORMONE: CPT | Performed by: INTERNAL MEDICINE

## 2024-06-09 PROCEDURE — 93010 ELECTROCARDIOGRAM REPORT: CPT | Performed by: INTERNAL MEDICINE

## 2024-06-09 PROCEDURE — 93005 ELECTROCARDIOGRAM TRACING: CPT

## 2024-06-09 PROCEDURE — 2500000001 HC RX 250 WO HCPCS SELF ADMINISTERED DRUGS (ALT 637 FOR MEDICARE OP): Performed by: INTERNAL MEDICINE

## 2024-06-09 PROCEDURE — 85027 COMPLETE CBC AUTOMATED: CPT | Performed by: INTERNAL MEDICINE

## 2024-06-09 PROCEDURE — 2500000004 HC RX 250 GENERAL PHARMACY W/ HCPCS (ALT 636 FOR OP/ED): Performed by: INTERNAL MEDICINE

## 2024-06-09 PROCEDURE — 84100 ASSAY OF PHOSPHORUS: CPT | Performed by: INTERNAL MEDICINE

## 2024-06-09 PROCEDURE — 99233 SBSQ HOSP IP/OBS HIGH 50: CPT | Performed by: INTERNAL MEDICINE

## 2024-06-09 PROCEDURE — 2500000002 HC RX 250 W HCPCS SELF ADMINISTERED DRUGS (ALT 637 FOR MEDICARE OP, ALT 636 FOR OP/ED): Performed by: INTERNAL MEDICINE

## 2024-06-09 PROCEDURE — 82947 ASSAY GLUCOSE BLOOD QUANT: CPT | Mod: 91

## 2024-06-09 RX ORDER — GLIMEPIRIDE 2 MG/1
2 TABLET ORAL
Status: DISCONTINUED | OUTPATIENT
Start: 2024-06-10 | End: 2024-06-10

## 2024-06-09 RX ORDER — LEVOFLOXACIN 5 MG/ML
500 INJECTION, SOLUTION INTRAVENOUS EVERY 24 HOURS
Status: DISCONTINUED | OUTPATIENT
Start: 2024-06-09 | End: 2024-06-11 | Stop reason: HOSPADM

## 2024-06-09 RX ORDER — INSULIN GLARGINE 100 [IU]/ML
12 INJECTION, SOLUTION SUBCUTANEOUS NIGHTLY
Status: DISCONTINUED | OUTPATIENT
Start: 2024-06-09 | End: 2024-06-11 | Stop reason: HOSPADM

## 2024-06-09 RX ADMIN — HEPARIN SODIUM 5000 UNITS: 5000 INJECTION INTRAVENOUS; SUBCUTANEOUS at 07:21

## 2024-06-09 RX ADMIN — INSULIN LISPRO 6 UNITS: 100 INJECTION, SOLUTION INTRAVENOUS; SUBCUTANEOUS at 07:21

## 2024-06-09 RX ADMIN — HEPARIN SODIUM 5000 UNITS: 5000 INJECTION INTRAVENOUS; SUBCUTANEOUS at 00:26

## 2024-06-09 RX ADMIN — HEPARIN SODIUM 5000 UNITS: 5000 INJECTION INTRAVENOUS; SUBCUTANEOUS at 15:14

## 2024-06-09 RX ADMIN — INSULIN GLARGINE 12 UNITS: 100 INJECTION, SOLUTION SUBCUTANEOUS at 21:00

## 2024-06-09 RX ADMIN — LEVOFLOXACIN 500 MG: 500 INJECTION, SOLUTION INTRAVENOUS at 10:29

## 2024-06-09 RX ADMIN — DOXYCYCLINE 100 MG: 100 INJECTION, POWDER, LYOPHILIZED, FOR SOLUTION INTRAVENOUS at 00:26

## 2024-06-09 RX ADMIN — ATORVASTATIN CALCIUM 20 MG: 40 TABLET, FILM COATED ORAL at 22:00

## 2024-06-09 ASSESSMENT — COGNITIVE AND FUNCTIONAL STATUS - GENERAL
DRESSING REGULAR UPPER BODY CLOTHING: A LITTLE
STANDING UP FROM CHAIR USING ARMS: A LITTLE
MOBILITY SCORE: 19
TOILETING: A LITTLE
TOILETING: A LITTLE
TURNING FROM BACK TO SIDE WHILE IN FLAT BAD: A LITTLE
MOVING TO AND FROM BED TO CHAIR: A LITTLE
DRESSING REGULAR UPPER BODY CLOTHING: A LITTLE
CLIMB 3 TO 5 STEPS WITH RAILING: TOTAL
DAILY ACTIVITIY SCORE: 20
STANDING UP FROM CHAIR USING ARMS: A LITTLE
WALKING IN HOSPITAL ROOM: A LITTLE
MOVING TO AND FROM BED TO CHAIR: A LITTLE
CLIMB 3 TO 5 STEPS WITH RAILING: A LOT
DRESSING REGULAR LOWER BODY CLOTHING: A LITTLE
HELP NEEDED FOR BATHING: A LITTLE
CLIMB 3 TO 5 STEPS WITH RAILING: A LOT
MOBILITY SCORE: 17
WALKING IN HOSPITAL ROOM: A LITTLE
DAILY ACTIVITIY SCORE: 20
STANDING UP FROM CHAIR USING ARMS: A LITTLE
HELP NEEDED FOR BATHING: A LITTLE
TURNING FROM BACK TO SIDE WHILE IN FLAT BAD: A LITTLE
MOVING TO AND FROM BED TO CHAIR: A LITTLE
MOBILITY SCORE: 18
WALKING IN HOSPITAL ROOM: A LITTLE
DRESSING REGULAR LOWER BODY CLOTHING: A LITTLE

## 2024-06-09 ASSESSMENT — PAIN SCALES - GENERAL
PAINLEVEL_OUTOF10: 0 - NO PAIN

## 2024-06-09 ASSESSMENT — PAIN - FUNCTIONAL ASSESSMENT
PAIN_FUNCTIONAL_ASSESSMENT: 0-10

## 2024-06-09 ASSESSMENT — ACTIVITIES OF DAILY LIVING (ADL): ADL_ASSISTANCE: INDEPENDENT

## 2024-06-09 NOTE — CARE PLAN
The patient's goals for the shift include      The clinical goals for the shift include pt will be free from falls      Problem: Pain - Adult  Goal: Verbalizes/displays adequate comfort level or baseline comfort level  Outcome: Progressing     Problem: Safety - Adult  Goal: Free from fall injury  Outcome: Progressing     Problem: Discharge Planning  Goal: Discharge to home or other facility with appropriate resources  Outcome: Progressing     Problem: Chronic Conditions and Co-morbidities  Goal: Patient's chronic conditions and co-morbidity symptoms are monitored and maintained or improved  Outcome: Progressing     Problem: Diabetes  Goal: Achieve decreasing blood glucose levels by end of shift  Outcome: Progressing  Goal: Increase stability of blood glucose readings by end of shift  Outcome: Progressing  Goal: Decrease in ketones present in urine by end of shift  Outcome: Progressing  Goal: Maintain electrolyte levels within acceptable range throughout shift  Outcome: Progressing  Goal: Maintain glucose levels >70mg/dl to <250mg/dl throughout shift  Outcome: Progressing  Goal: No changes in neurological exam by end of shift  Outcome: Progressing  Goal: Learn about and adhere to nutrition recommendations by end of shift  Outcome: Progressing  Goal: Vital signs within normal range for age by end of shift  Outcome: Progressing  Goal: Increase self care and/or family involovement by end of shift  Outcome: Progressing  Goal: Receive DSME education by end of shift  Outcome: Progressing     Problem: Fall/Injury  Goal: Not fall by end of shift  Outcome: Progressing  Goal: Be free from injury by end of the shift  Outcome: Progressing  Goal: Verbalize understanding of personal risk factors for fall in the hospital  Outcome: Progressing  Goal: Verbalize understanding of risk factor reduction measures to prevent injury from fall in the home  Outcome: Progressing  Goal: Use assistive devices by end of the shift  Outcome:  Progressing  Goal: Pace activities to prevent fatigue by end of the shift  Outcome: Progressing   .

## 2024-06-09 NOTE — PROGRESS NOTES
Johnny Motta is a 73 y.o. female on day 1 of admission presenting with Sepsis due to pneumonia (Multi).    Subjective   Ms. Motta was seen and examined this morning. She reports poor night sleep and attributed it to being in the hospital. She denied nausea and vomiting.  She denied fever and chills and noted that her last fever was yesterday afternoon.  We discussed her recent labs especially some improvement in her WBC.   We discussed her poorly controlled BGL. She did tell me that she was on metformin in the past and quit taking it about 2 weeks ago due to side effect of upset stomach, abdominal cramping and loose stool. She said she didn't notify her PCP before stopping metforming.  She is agreeable to trial of Januvia at discharge and following up with her PCP.   She was also made aware of the incidental adrenal nodule found on CT of the abdomen. She agreed to additional image with MRI. She denied any foreign metallic object in her body such as pacemaker and insulin pump.    Objective     Physical Exam  Constitutional:       Appearance: Normal appearance.   HENT:      Head: Normocephalic.      Nose: Nose normal.      Mouth/Throat:      Mouth: Mucous membranes are moist.   Eyes:      Extraocular Movements: Extraocular movements intact.      Pupils: Pupils are equal, round, and reactive to light.   Cardiovascular:      Rate and Rhythm: Normal rate and regular rhythm.   Pulmonary:      Effort: Pulmonary effort is normal.      Breath sounds: Normal breath sounds.   Abdominal:      General: Abdomen is flat. Bowel sounds are normal.      Palpations: Abdomen is soft.   Musculoskeletal:         General: Normal range of motion.      Cervical back: Normal range of motion and neck supple.   Skin:     General: Skin is warm and dry.   Neurological:      General: No focal deficit present.      Mental Status: She is alert and oriented to person, place, and time.       Last Recorded Vitals  Blood pressure 117/72, pulse (!)  "112, temperature 36.9 °C (98.5 °F), temperature source Temporal, resp. rate 17, height 1.575 m (5' 2\"), weight 49 kg (108 lb), SpO2 91%.  Intake/Output last 3 Shifts:  I/O last 3 completed shifts:  In: 100 (2 mL/kg) [IV Piggyback:100]  Out: - (0 mL/kg)   Weight: 49 kg     Relevant Results           This patient currently has cardiac telemetry ordered; if you would like to modify or discontinue the telemetry order, click here to go to the orders activity to modify/discontinue the order.      Assessment/Plan   Principal Problem:    Sepsis due to pneumonia (Multi)    Sepsis without shock in the Setting of Pneumonia: gradual improving. Leukocytosis is now 17 from 21. Continue current abx. Last documented fever was on 6/8 at 1500 to 1600.     Legionella Pneumonia:  Legionella is positive. Strep is negative. Discontinued doxy and ceftriaxone. Started on Levaquin. Check EKG ( QT) before starting levaquin.  May need to get ID vs infectious control input    DM type 2: poorly controlled. Now realized that Endo is following. Will consult diabetic education and defer DM medication to Endo team. Currently on 10 Units of lantus and sliding scale. Noted that she was unable to tolerate Metformin. Initially thought of sending her home on januvia, but will defer to endo    Acute Respiratory Failure with hypoxia:  resolved. Check ambulatory pulse oxygen at discharge    Hyponatremia:  possibly due to Hyperglycemia. Continue to monitor. May also be due to recent Legionella infection. No systemic symptoms for now.    Elevated Trop:  this is in the setting of Pna. Suspects demand ischemia. Continue to monitor    Hypokalemia:  resolved    Tobacco Abuse: counseled on cessation    Debility/deconditioning:  pt/OT. Encourage out of bed ambulation.            I spent 30 minutes in the professional and overall care of this patient.      Cezar Quiroz MD      "

## 2024-06-09 NOTE — PROGRESS NOTES
"Johnny Motta is a 73 y.o. female on day 1 of admission presenting with Sepsis due to pneumonia (Multi).    Subjective   Patient admits to feeling better.  She is tolerating meals.     I have reviewed histories, allergies and medications have been reviewed and there are no changes       Objective     Physical Exam  Vitals and nursing note reviewed.   Constitutional:       General: She is not in acute distress.     Appearance: Normal appearance. She is normal weight.   HENT:      Head: Normocephalic and atraumatic.      Nose: Nose normal.      Mouth/Throat:      Mouth: Mucous membranes are moist.   Eyes:      Extraocular Movements: Extraocular movements intact.   Pulmonary:      Effort: Pulmonary effort is normal.   Musculoskeletal:         General: Normal range of motion.   Neurological:      Mental Status: She is alert and oriented to person, place, and time.   Psychiatric:         Mood and Affect: Mood normal.         Last Recorded Vitals  Blood pressure 117/72, pulse (!) 112, temperature 36.9 °C (98.5 °F), temperature source Temporal, resp. rate 17, height 1.575 m (5' 2\"), weight 49 kg (108 lb), SpO2 91%.  Intake/Output last 3 Shifts:  I/O last 3 completed shifts:  In: 100 (2 mL/kg) [IV Piggyback:100]  Out: - (0 mL/kg)   Weight: 49 kg     Relevant Results  Results from last 7 days   Lab Units 06/09/24  0826 06/09/24  0000 06/08/24 2016 06/08/24  1541 06/08/24  1301 06/08/24  1204 06/08/24  0804 06/08/24  0515 06/07/24  2114   POCT GLUCOSE mg/dL 171*  --  310* 184* 198* 179*   < >  --   --    GLUCOSE mg/dL  --  224*  --   --   --   --   --  240* 301*    < > = values in this interval not displayed.     Scheduled medications  atorvastatin, 20 mg, oral, Nightly  cefTRIAXone, 1 g, intravenous, q24h  doxycycline, 100 mg, intravenous, q12h  heparin (porcine), 5,000 Units, subcutaneous, q8h  insulin glargine, 10 Units, subcutaneous, Nightly  insulin lispro, 0-10 Units, subcutaneous, TID      Continuous medications   "   PRN medications  PRN medications: acetaminophen, dextrose, dextrose, glucagon, glucagon, ondansetron    Results for orders placed or performed during the hospital encounter of 06/07/24 (from the past 24 hour(s))   POCT GLUCOSE   Result Value Ref Range    POCT Glucose 179 (H) 74 - 99 mg/dL   POCT GLUCOSE   Result Value Ref Range    POCT Glucose 198 (H) 74 - 99 mg/dL   POCT GLUCOSE   Result Value Ref Range    POCT Glucose 184 (H) 74 - 99 mg/dL   POCT GLUCOSE   Result Value Ref Range    POCT Glucose 310 (H) 74 - 99 mg/dL   CBC   Result Value Ref Range    WBC 17.9 (H) 4.4 - 11.3 x10*3/uL    nRBC 0.0 0.0 - 0.0 /100 WBCs    RBC 4.27 4.00 - 5.20 x10*6/uL    Hemoglobin 12.7 12.0 - 16.0 g/dL    Hematocrit 36.6 36.0 - 46.0 %    MCV 86 80 - 100 fL    MCH 29.7 26.0 - 34.0 pg    MCHC 34.7 32.0 - 36.0 g/dL    RDW 13.3 11.5 - 14.5 %    Platelets 164 150 - 450 x10*3/uL   Renal function panel   Result Value Ref Range    Glucose 224 (H) 74 - 99 mg/dL    Sodium 131 (L) 136 - 145 mmol/L    Potassium 3.5 3.5 - 5.3 mmol/L    Chloride 99 98 - 107 mmol/L    Bicarbonate 22 21 - 32 mmol/L    Anion Gap 14 10 - 20 mmol/L    Urea Nitrogen 16 6 - 23 mg/dL    Creatinine 0.58 0.50 - 1.05 mg/dL    eGFR >90 >60 mL/min/1.73m*2    Calcium 9.2 8.6 - 10.3 mg/dL    Phosphorus 1.9 (L) 2.5 - 4.9 mg/dL    Albumin 2.9 (L) 3.4 - 5.0 g/dL   TSH   Result Value Ref Range    Thyroid Stimulating Hormone 0.54 0.44 - 3.98 mIU/L   POCT GLUCOSE   Result Value Ref Range    POCT Glucose 171 (H) 74 - 99 mg/dL      CT abdomen pelvis w IV contrast    Result Date: 6/7/2024  Interpreted By:  Juan Jose Montelongo, STUDY: CT ABDOMEN PELVIS W IV CONTRAST;  6/7/2024 10:19 pm   INDICATION: Signs/Symptoms:abdominal pain.   COMPARISON: None.   ACCESSION NUMBER(S): [id number]   ORDERING CLINICIAN: FIDELIA GRUBBS   TECHNIQUE: Axial CT images of the abdomen and pelvis with coronal and sagittal reconstructed images obtained after intravenous administration of 75 mL of Omnipaque 350    FINDINGS: LOWER CHEST: Dense consolidative opacity in the left lower lobe concerning for developing pneumonia.   ABDOMEN:   LIVER: Normal morphology. Liver is hypodense relative to the spleen suggestive of steatosis. Subcentimeter hypodense focus in the hepatic dome is too small to characterize. BILE DUCTS: Normal caliber. GALLBLADDER: Status post cholecystectomy. PANCREAS: Within normal limits. SPLEEN: Within normal limits. ADRENALS: There is a 1.8 cm left adrenal nodule which is incompletely characterized on this monophasic study. Right adrenal glands within normal limits. KIDNEYS and URETERS: Symmetric renal enhancement. No hydronephrosis or perinephric fluid collection. Several hypodense left renal lesions demonstrate fluid attenuation, largest measuring up to 1.3 cm compatible with cysts. Additional subcentimeter hypodense foci are too small to characterize.   VESSELS:  Calcific atherosclerosis of the aortoiliac and mesenteric vessels. No aortic aneurysm. RETROPERITONEUM: No pathologically enlarged retroperitoneal lymph nodes.   PELVIS:   REPRODUCTIVE ORGANS: Uterus is present. No adnexal mass. BLADDER: Within normal limits.   BOWEL: Stomach is under distended with apparent wall thickening. Visualized loops of bowel are without evidence for obstruction. Moderate stool burden. Scattered colonic diverticula without evidence for acute diverticulitis. No pneumatosis or portal venous gas. Normal appendix. PERITONEUM: No ascites or free air, no fluid collection.   ABDOMINAL WALL: Within normal limits. BONES: Multilevel degenerative changes of the spine.       No acute abdominal or pelvic process.   Dense consolidative opacity in the left lower lobe concerning for developing pneumonia. Posttreatment follow-up is recommended to ensure complete resolution.   There is a 1.8 cm left adrenal nodule which is incompletely characterized on this monophasic study. This can be further evaluated with nonemergent adrenal washout  CT or MRI.   Scattered colonic diverticula without evidence for acute diverticulitis.   Additional findings as described above.   MACRO: None   Signed by: Juan Jose Montelongo 6/7/2024 11:04 PM Dictation workstation:   [id number]    XR chest 2 views    Result Date: 6/7/2024  Interpreted By:  Juan Jose Montelongo, STUDY: XR CHEST 2 VIEWS;  6/7/2024 10:06 pm   INDICATION: Signs/Symptoms:cough.   COMPARISON: Chest x-ray 04/11/2024   ACCESSION NUMBER(S): [id number]   ORDERING CLINICIAN: FIDELIA GRUBBS   FINDINGS:     CARDIOMEDIASTINAL SILHOUETTE: Cardiomediastinal silhouette is normal in size and configuration.   LUNGS: Retrocardiac airspace opacity concerning for developing pneumonia. Right lung is clear. No sizeable effusion or pneumothorax.   ABDOMEN: Multiple surgical clips noted in the upper abdomen.   BONES: Multilevel degenerative changes of the spine.       There is retrocardiac airspace opacity concerning for developing pneumonia. Posttreatment follow-up is recommended to ensure complete resolution and exclude underlying lesion.   MACRO: None   Signed by: Juan Jose Montelongo 6/7/2024 10:41 PM Dictation workstation:   [id number]      Assessment/Plan   Principal Problem:    Sepsis due to pneumonia (Multi)    IMPRESSION  Poorly controlled tyep II DM  A1C of 11.6%     Recommendations:  To increase Lantus to 12 units subcutaneous bedtime   Continue insulin correction scale TID AC   To commence glimepiride 2mg once daily   Diabetic diet as tolerated   [facility]    Hypoglycemic protocol   Counseled that metformin therapy will not be resumed upon discharge given GI symptoms  Counseled that the goal A1C should be 7% or less  Counseled glycemic control is warranted to prevent microvascular complications  Will continue to follow     Sherly Moreno MD

## 2024-06-09 NOTE — PROGRESS NOTES
Physical Therapy    Physical Therapy Evaluation    Patient Name: Johnny Motta  MRN: [id number]  Today's Date: 6/9/2024   Time Calculation  Start Time: 0944  Stop Time: 1013  Time Calculation (min): 29 min  2009/2009-A    Assessment/Plan   PT Assessment  PT Assessment Results: Decreased endurance, Impaired balance, Decreased mobility, Decreased safety awareness  Rehab Prognosis: Excellent  Barriers to Discharge: Lives alone; recent falls.  Evaluation/Treatment Tolerance: Patient tolerated treatment well, Patient limited by fatigue  Medical Staff Made Aware: Yes  End of Session Communication: Bedside nurse  Assessment Comment: Patient presents with deficits in endurance, balance, low PSO2 with activity. With recent falls and dx of pneumonia and sepsis, continued PT intervention is recommended to focus on impairments noted to reduce fall risk and encourage return to prior level of function. With daughter offer for patient to return to her home for recovery, recommend low intensity rehab.  End of Session Patient Position: Up in chair, Alarm on  IP OR SWING BED PT PLAN  Inpatient or Swing Bed: Inpatient      06/09/24 0944   PT Plan   Treatment/Interventions Bed mobility;Transfer training;Gait training;Stair training;Balance training;Endurance training;Therapeutic exercise;Therapeutic activity   PT Plan Skilled PT   PT Frequency 5 times per week   PT Discharge Recommendations Low intensity level of continued care  (Daughter( works from home) has offered for pt to recover at her home vs. a rehab facility.)   Equipment Recommended upon Discharge   (May benefit from assistive device, type dependent on recovery and therapy trials.)   PT Recommended Transfer Status Assist x1   PT - OK to Discharge Yes  (Once medically cleared.)       Subjective     Current Problem:  1. Sepsis due to pneumonia (Multi)          Patient Active Problem List   Diagnosis    Chest pain    Unstable angina (Multi)    Sepsis due to pneumonia (Multi)        General Visit Information:  General  Reason for Referral: Pneumonia, sepsis. Generalized weakness & falls.  Referred By: Javon Vázquez DO  Past Medical History Relevant to Rehab: 74 y/o female admitted with difficulty breathing, new onset weakness and falls. Dx with PNA and sepsis. (PMHx: Angina, uncontrolled NIDDM.)  Family/Caregiver Present: Yes  Caregiver Feedback: Daughter present, participated by supporting her mother and asking therapist questions. Daughter offering patient to stay with her (works from home) for recovery post d/c from hospital.  Prior to Session Communication: Bedside nurse  Patient Position Received: Bed, 3 rail up, Alarm on  General Comment: Patient alert, sipping on coffee her daughter provided. Pt agreeable to participate in PT intervention.    Home Living:  Home Living  Type of Home: House  Lives With: Alone  Home Adaptive Equipment: None  Home Layout: One level  Home Access: Level entry  Bathroom Shower/Tub: Tub/shower unit  Bathroom Equipment: Grab bars in shower  Home Living Comments: Patient reports [employer] housing, handicap accessible. (2 [employer]ST[employer] daughter's home.)    Prior Level of Function:  Prior Function Per Pt/Caregiver Report  Level of [place]: Independent with ADLs and functional transfers, Independent with homemaking with ambulation  ADL Assistance: Independent  Homemaking Assistance: Independent  Ambulatory Assistance: Independent  Leisure: Weeding, spending time with grandkids.  Prior Function Comments: +drives, cooks, cleans, takes laundry to [employer]und[employer]at.    Precautions:  Precautions  Precautions Comment: Fall precautions. Monitor PSO2 & HR with activity. Monitor intake/output.    Vital Signs:  Vital Signs  Heart Rate: 102 (Initially post ambulation on room air. Decreased to 69 BPM with 2 minutes of seated rest)  Heart Rate Source: Monitor  SpO2: (!) 87 % (Post ambulation on room air. With VC for pursed lip breathing (tendency to breathe through mouth), improved to 89%.  "Nurse notified.)  Patient Position: Sitting  Objective     Pain:  Pain Assessment  Pain Assessment: 0-10  Pain Score: 0 - No pain    Cognition:  Cognition  Overall Cognitive Status: Within Functional Limits  Attention: Within Functional Limits  Memory: Within Funtional Limits  Safety/Judgement: Within Functional Limits  Processing Speed: Delayed (Patient reports feeling weak and disoriented.)    General Assessments:  General Observation  General Observation: Patient provided with VC for hand placement for safe sit <> stand due to balance deficit. Facilitation for balance during gait without device due to deviation of path to L. Educated pt and her daughter in correlation of weakness, falls, and dx. Recommended trial of WW for stability. Ambulated 20 feet with WW and CGA without deviation in path and with improved balance. Patient identifies upon questioning improved balance \"like normal\" though she does not like having to use the walker. Educated pt in need to request assist for transfers, provided with call light.   Activity Tolerance  Endurance: Decreased tolerance for upright activites, Tolerates less than 10 min exercise with changes in vital signs  Rate of Perceived Exertion (RPE): 7/10 (Post ambulation)  Sensation  Light Touch: No apparent deficits  Strength  Strength Comments: B LE 4+/5  Perception  Inattention/Neglect: Appears intact  Coordination  Movements are Fluid and Coordinated: Yes     Static Sitting Balance  Static Sitting-Balance Support: Bilateral upper extremity supported  Static Sitting-Level of Assistance: Close supervision  Static Standing Balance  Static Standing-Balance Support: No upper extremity supported  Static Standing-Level of Assistance: Minimum assistance    Functional Assessments:     Bed Mobility  Bed Mobility:  (Supine > sit CGA)  Transfers  Transfer:  (Sit <> stand CGA)  Ambulation/Gait Training  Ambulation/Gait Training Performed:  (Ambulated 60 feet without device with repeated " L deviation in path requiring MIN A to correct.)          Extremity/Trunk Assessments:        RLE   RLE : Within Functional Limits  LLE   LLE : Within Functional Limits    Outcome Measures:     [facility] Basic Mobility  Turning from your back to your side while in a flat bed without using bedrails: None  Moving from lying on your back to sitting on the side of a flat bed without using bedrails: A little  Moving to and from bed to chair (including a wheelchair): A little  Standing up from a chair using your arms (e.g. wheelchair or bedside chair): A little  To walk in hospital room: A little  Climbing 3-5 steps with railing: Total  Basic Mobility - Total Score: 17                                        Goals:  Encounter Problems       Encounter Problems (Active)       Pain - Adult          To improve endurance, balance, and safe mobility:       Patient will complete bed mobility and sit <> stand with S/MOD I.       Start:  06/09/24    Expected End:  06/23/24            Patient will complete 5 consecutive stands in MODIFIED 30 SECOND CHAIR RISE test with SBA/S and participate in dynamic standing activities for 4 minutes with PSO2 and HR WNL.       Start:  06/09/24    Expected End:  06/23/24            Patient will ambulate 100 feet with least restrictive assistive device vs none with SBA/S without deviation in path.       Start:  06/09/24    Expected End:  06/23/24            Patient will negotiate 2 steps with 1 rail and SBA to enter her daughter's home.       Start:  06/09/24    Expected End:  06/23/24                 Education Documentation  Mobility Training, taught by Allison Selby PT at 6/9/2024  2:41 PM.  Learner: Family, Patient  Readiness: Acceptance  Method: Explanation  Response: Verbalizes Understanding, Needs Reinforcement    Education Comments  No comments found.

## 2024-06-09 NOTE — PROGRESS NOTES
Social work consult placed for discharge planning. SW reviewed pt's chart, no SW needs foreseen at this time. SW signing off; available upon request.

## 2024-06-10 LAB
ALBUMIN SERPL BCP-MCNC: 2.6 G/DL (ref 3.4–5)
ANION GAP SERPL CALC-SCNC: 10 MMOL/L (ref 10–20)
ATRIAL RATE: 106 BPM
ATRIAL RATE: 112 BPM
ATRIAL RATE: 93 BPM
BUN SERPL-MCNC: 12 MG/DL (ref 6–23)
CALCIUM SERPL-MCNC: 8.7 MG/DL (ref 8.6–10.3)
CHLORIDE SERPL-SCNC: 97 MMOL/L (ref 98–107)
CO2 SERPL-SCNC: 29 MMOL/L (ref 21–32)
CREAT SERPL-MCNC: 0.5 MG/DL (ref 0.5–1.05)
EGFRCR SERPLBLD CKD-EPI 2021: >90 ML/MIN/1.73M*2
ERYTHROCYTE [DISTWIDTH] IN BLOOD BY AUTOMATED COUNT: 13.2 % (ref 11.5–14.5)
GLUCOSE BLD MANUAL STRIP-MCNC: 101 MG/DL (ref 74–99)
GLUCOSE BLD MANUAL STRIP-MCNC: 120 MG/DL (ref 74–99)
GLUCOSE BLD MANUAL STRIP-MCNC: 217 MG/DL (ref 74–99)
GLUCOSE BLD MANUAL STRIP-MCNC: 232 MG/DL (ref 74–99)
GLUCOSE SERPL-MCNC: 239 MG/DL (ref 74–99)
HCT VFR BLD AUTO: 34.4 % (ref 36–46)
HGB BLD-MCNC: 11.9 G/DL (ref 12–16)
MCH RBC QN AUTO: 29.5 PG (ref 26–34)
MCHC RBC AUTO-ENTMCNC: 34.6 G/DL (ref 32–36)
MCV RBC AUTO: 85 FL (ref 80–100)
NRBC BLD-RTO: 0 /100 WBCS (ref 0–0)
P AXIS: 136 DEGREES
P AXIS: 67 DEGREES
P AXIS: 69 DEGREES
P OFFSET: 192 MS
P ONSET: 146 MS
PHOSPHATE SERPL-MCNC: 1.5 MG/DL (ref 2.5–4.9)
PLATELET # BLD AUTO: 194 X10*3/UL (ref 150–450)
POTASSIUM SERPL-SCNC: 3.1 MMOL/L (ref 3.5–5.3)
PR INTERVAL: 112 MS
PR INTERVAL: 115 MS
PR INTERVAL: 132 MS
Q ONSET: 202 MS
Q ONSET: 249 MS
Q ONSET: 253 MS
QRS COUNT: 14 BEATS
QRS COUNT: 18 BEATS
QRS COUNT: 18 BEATS
QRS DURATION: 120 MS
QRS DURATION: 130 MS
QRS DURATION: 132 MS
QT INTERVAL: 329 MS
QT INTERVAL: 352 MS
QT INTERVAL: 356 MS
QTC CALCULATION(BAZETT): 443 MS
QTC CALCULATION(BAZETT): 447 MS
QTC CALCULATION(BAZETT): 467 MS
QTC FREDERICIA: 404 MS
QTC FREDERICIA: 412 MS
QTC FREDERICIA: 425 MS
R AXIS: -28 DEGREES
R AXIS: 174 DEGREES
R AXIS: 29 DEGREES
RBC # BLD AUTO: 4.03 X10*6/UL (ref 4–5.2)
SODIUM SERPL-SCNC: 133 MMOL/L (ref 136–145)
T AXIS: 15 DEGREES
T AXIS: 19 DEGREES
T AXIS: 26 DEGREES
T OFFSET: 378 MS
T OFFSET: 414 MS
T OFFSET: 431 MS
VENTRICULAR RATE: 106 BPM
VENTRICULAR RATE: 111 BPM
VENTRICULAR RATE: 93 BPM
WBC # BLD AUTO: 13.1 X10*3/UL (ref 4.4–11.3)

## 2024-06-10 PROCEDURE — 80069 RENAL FUNCTION PANEL: CPT | Performed by: INTERNAL MEDICINE

## 2024-06-10 PROCEDURE — 85027 COMPLETE CBC AUTOMATED: CPT | Performed by: INTERNAL MEDICINE

## 2024-06-10 PROCEDURE — 82947 ASSAY GLUCOSE BLOOD QUANT: CPT

## 2024-06-10 PROCEDURE — 2500000002 HC RX 250 W HCPCS SELF ADMINISTERED DRUGS (ALT 637 FOR MEDICARE OP, ALT 636 FOR OP/ED): Performed by: INTERNAL MEDICINE

## 2024-06-10 PROCEDURE — 2500000004 HC RX 250 GENERAL PHARMACY W/ HCPCS (ALT 636 FOR OP/ED): Performed by: INTERNAL MEDICINE

## 2024-06-10 PROCEDURE — 99233 SBSQ HOSP IP/OBS HIGH 50: CPT | Performed by: INTERNAL MEDICINE

## 2024-06-10 PROCEDURE — 97116 GAIT TRAINING THERAPY: CPT | Mod: GP,CQ | Performed by: PHYSICAL THERAPY ASSISTANT

## 2024-06-10 PROCEDURE — 2500000001 HC RX 250 WO HCPCS SELF ADMINISTERED DRUGS (ALT 637 FOR MEDICARE OP): Performed by: INTERNAL MEDICINE

## 2024-06-10 PROCEDURE — 36415 COLL VENOUS BLD VENIPUNCTURE: CPT | Performed by: INTERNAL MEDICINE

## 2024-06-10 PROCEDURE — 97112 NEUROMUSCULAR REEDUCATION: CPT | Mod: GP,CQ | Performed by: PHYSICAL THERAPY ASSISTANT

## 2024-06-10 PROCEDURE — 2060000001 HC INTERMEDIATE ICU ROOM DAILY

## 2024-06-10 PROCEDURE — 97165 OT EVAL LOW COMPLEX 30 MIN: CPT | Mod: GO | Performed by: OCCUPATIONAL THERAPIST

## 2024-06-10 RX ORDER — GLIMEPIRIDE 4 MG/1
4 TABLET ORAL
Status: DISCONTINUED | OUTPATIENT
Start: 2024-06-10 | End: 2024-06-11 | Stop reason: HOSPADM

## 2024-06-10 RX ORDER — POTASSIUM CHLORIDE 20 MEQ/1
20 TABLET, EXTENDED RELEASE ORAL ONCE
Status: COMPLETED | OUTPATIENT
Start: 2024-06-10 | End: 2024-06-10

## 2024-06-10 RX ADMIN — HEPARIN SODIUM 5000 UNITS: 5000 INJECTION INTRAVENOUS; SUBCUTANEOUS at 17:15

## 2024-06-10 RX ADMIN — INSULIN LISPRO 4 UNITS: 100 INJECTION, SOLUTION INTRAVENOUS; SUBCUTANEOUS at 12:15

## 2024-06-10 RX ADMIN — HEPARIN SODIUM 5000 UNITS: 5000 INJECTION INTRAVENOUS; SUBCUTANEOUS at 23:55

## 2024-06-10 RX ADMIN — GLIMEPIRIDE 4 MG: 4 TABLET ORAL at 17:15

## 2024-06-10 RX ADMIN — POTASSIUM CHLORIDE 20 MEQ: 1500 TABLET, EXTENDED RELEASE ORAL at 10:06

## 2024-06-10 RX ADMIN — HEPARIN SODIUM 5000 UNITS: 5000 INJECTION INTRAVENOUS; SUBCUTANEOUS at 10:05

## 2024-06-10 RX ADMIN — INSULIN GLARGINE 12 UNITS: 100 INJECTION, SOLUTION SUBCUTANEOUS at 21:00

## 2024-06-10 RX ADMIN — INSULIN LISPRO 4 UNITS: 100 INJECTION, SOLUTION INTRAVENOUS; SUBCUTANEOUS at 10:07

## 2024-06-10 RX ADMIN — LEVOFLOXACIN 500 MG: 500 INJECTION, SOLUTION INTRAVENOUS at 10:06

## 2024-06-10 RX ADMIN — ATORVASTATIN CALCIUM 20 MG: 40 TABLET, FILM COATED ORAL at 21:15

## 2024-06-10 RX ADMIN — HEPARIN SODIUM 5000 UNITS: 5000 INJECTION INTRAVENOUS; SUBCUTANEOUS at 01:04

## 2024-06-10 ASSESSMENT — COGNITIVE AND FUNCTIONAL STATUS - GENERAL
CLIMB 3 TO 5 STEPS WITH RAILING: A LITTLE
HELP NEEDED FOR BATHING: A LITTLE
MOVING TO AND FROM BED TO CHAIR: A LITTLE
HELP NEEDED FOR BATHING: A LITTLE
DAILY ACTIVITIY SCORE: 22
CLIMB 3 TO 5 STEPS WITH RAILING: TOTAL
MOBILITY SCORE: 17
TOILETING: A LITTLE
WALKING IN HOSPITAL ROOM: A LITTLE
STANDING UP FROM CHAIR USING ARMS: A LITTLE
TURNING FROM BACK TO SIDE WHILE IN FLAT BAD: A LITTLE
DAILY ACTIVITIY SCORE: 22
TOILETING: A LITTLE
MOBILITY SCORE: 23

## 2024-06-10 ASSESSMENT — PAIN - FUNCTIONAL ASSESSMENT
PAIN_FUNCTIONAL_ASSESSMENT: 0-10

## 2024-06-10 ASSESSMENT — ACTIVITIES OF DAILY LIVING (ADL)
ADL_ASSISTANCE: INDEPENDENT
LACK_OF_TRANSPORTATION: NO

## 2024-06-10 ASSESSMENT — PAIN SCALES - GENERAL
PAINLEVEL_OUTOF10: 0 - NO PAIN

## 2024-06-10 NOTE — PROGRESS NOTES
Physical Therapy    Physical Therapy Treatment    Patient Name: Johnny Motta  MRN: [id number]  Today's Date: 6/10/2024  Time Calculation  Start Time: 1023  Stop Time: 1046  Time Calculation (min): 23 min    Assessment/Plan   PT Assessment  End of Session Communication: Bedside nurse  Assessment Comment: pt is making good progress toward all goals set by PT. pt would benefit from further skilled pT services. pt impulsive at times with walker and required education to correct.  End of Session Patient Position: Up in chair, Alarm on     PT Plan  Treatment/Interventions: Bed mobility, Transfer training, Gait training, Stair training, Balance training, Endurance training, Therapeutic exercise, Therapeutic activity  PT Plan: Skilled PT  PT Frequency: 5 times per week  PT Discharge Recommendations: Low intensity level of continued care (Daughter( works from home) has offered for pt to recover at her home vs. a rehab facility.)  Equipment Recommended upon Discharge:  (May benefit from assistive device, type dependent on recovery and therapy trials.)  PT Recommended Transfer Status: Assist x1  PT - OK to Discharge: Yes (Once medically cleared.)      General Visit Information:   PT  Visit  PT Received On: 06/10/24  Response to Previous Treatment: Patient with no complaints from previous session.  General  Reason for Referral: Pneumonia, sepsis. Generalized weakness & falls.  Referred By: Javon Vázquez DO  Past Medical History Relevant to Rehab: 72 y/o female admitted with difficulty breathing, new onset weakness and falls. Dx with PNA and sepsis.  Prior to Session Communication: Bedside nurse  Patient Position Received: Bed, 3 rail up, Alarm on  Preferred Learning Style: verbal  General Comment: pt agreeable to PT and cleared by RN. pt reporting she is to have MRI today.      Precautions:  Precautions  Medical Precautions: Fall precautions  Precautions Comment: Fall precautions. Monitor PSO2 & HR with activity. Monitor  intake/output.    Pain:  Pain Assessment  Pain Assessment: 0-10  Pain Score: 0 - No pain  Cognition:  Cognition  Overall Cognitive Status: Within Functional Limits       :     Treatments:  Balance/Neuromuscular Re-Education  Balance/Neuromuscular Re-Education Activity Performed: Yes  Balance/Neuromuscular Re-Education Activity 1: pt stood multiple attempts thi session ranging from 1>5 minutes. pt was able to perform static and dynamic proactive and reactive activities. pt required CGA and no LOB was noted. pt used with W/W    Bed Mobility  Bed Mobility: Yes  Bed Mobility 1  Bed Mobility 1: Supine to sitting  Level of Assistance 1: Close supervision  Bed Mobility Comments 1: pt with cues for safety.    Ambulation/Gait Training  Ambulation/Gait Training Performed: Yes  Ambulation/Gait Training 1  Surface 1: Level tile  Device 1: Rolling walker  Assistance 1: Contact guard  Comments/Distance (ft) 1: pt ambulated 75'x1 and 35'x2 with cues for safety and proper walker technique. pt is impulsive at times and does not keep walker with her. pt corrects with cues.  Transfers  Transfer: Yes  Transfer 1  Technique 1: Sit to stand, Stand to sit  Transfer Device 1: Walker  Transfer Level of Assistance 1: Contact guard  Trials/Comments 1: cues for proper hand placement and safety.  Transfers 2  Technique 2: Stand pivot  Transfer Device 2: Walker  Transfer Level of Assistance 2: Contact guard  Trials/Comments 2: pt with cues to keep walker with her during the pivot.    Outcome Measures:  [facility] Basic Mobility  Turning from your back to your side while in a flat bed without using bedrails: None  Moving from lying on your back to sitting on the side of a flat bed without using bedrails: A little  Moving to and from bed to chair (including a wheelchair): A little  Standing up from a chair using your arms (e.g. wheelchair or bedside chair): A little  To walk in hospital room: A little  Climbing 3-5 steps with railing: Total  Basic  Mobility - Total Score: 17    Education Documentation  Mobility Training, taught by Swapna Aadme PTA at 6/10/2024 11:05 AM.  Learner: Patient  Readiness: Acceptance  Method: Explanation  Response: Verbalizes Understanding, Needs Reinforcement    Mobility Training, taught by Swapna Adame PTA at 6/10/2024 11:04 AM.  Learner: Patient  Readiness: Acceptance  Method: Explanation  Response: Verbalizes Understanding, Needs Reinforcement    Education Comments  No comments found.               Encounter Problems       Encounter Problems (Active)       Pain - Adult          To improve endurance, balance, and safe mobility:       Patient will complete bed mobility and sit <> stand with S/MOD I. (Progressing)       Start:  06/09/24    Expected End:  06/23/24            Patient will complete 5 consecutive stands in MODIFIED 30 SECOND CHAIR RISE test with SBA/S and participate in dynamic standing activities for 4 minutes with PSO2 and HR WNL. (Progressing)       Start:  06/09/24    Expected End:  06/23/24            Patient will ambulate 100 feet with least restrictive assistive device vs none with SBA/S without deviation in path. (Progressing)       Start:  06/09/24    Expected End:  06/23/24            Patient will negotiate 2 steps with 1 rail and SBA to enter her daughter's home. (Not Progressing)       Start:  06/09/24    Expected End:  06/23/24

## 2024-06-10 NOTE — PROGRESS NOTES
Johnny Motta is a 73 y.o. female on day 2 of admission presenting with Sepsis due to pneumonia (Multi).    Subjective   Ms. Motta was seen and examined this morning. She reports poor night sleep and attributed it to being in the hospital. She denied nausea and vomiting.  She denied fever and chills and noted that her last fever was yesterday afternoon.  We discussed her recent labs especially some improvement in her WBC.   We discussed her poorly controlled BGL. She did tell me that she was on metformin in the past and quit taking it about 2 weeks ago due to side effect of upset stomach, abdominal cramping and loose stool. She said she didn't notify her PCP before stopping metforming.  She is agreeable to trial of Januvia at discharge and following up with her PCP.   She was also made aware of the incidental adrenal nodule found on CT of the abdomen. She agreed to additional image with MRI. She denied any foreign metallic object in her body such as pacemaker and insulin pump.    6/10: Patient seen.  Patient apparently has Legionella pneumonia based on antigens.  Switch to Levaquin.  QTc is borderline but sufficient.  Appreciate input from endocrine.  Watching glucose.  Consider discharge tomorrow if improving.  Patient states she feels weak, daughter is bringing a walker and tomorrow.      Objective     Physical Exam  Constitutional:       Appearance: Normal appearance.   HENT:      Head: Normocephalic.      Nose: Nose normal.      Mouth/Throat:      Mouth: Mucous membranes are moist.   Eyes:      Extraocular Movements: Extraocular movements intact.      Pupils: Pupils are equal, round, and reactive to light.   Cardiovascular:      Rate and Rhythm: Normal rate and regular rhythm.   Pulmonary:      Effort: Pulmonary effort is normal.      Breath sounds: Normal breath sounds.   Abdominal:      General: Abdomen is flat. Bowel sounds are normal.      Palpations: Abdomen is soft.   Musculoskeletal:         General:  "Normal range of motion.      Cervical back: Normal range of motion and neck supple.   Skin:     General: Skin is warm and dry.   Neurological:      General: No focal deficit present.      Mental Status: She is alert and oriented to person, place, and time.       Last Recorded Vitals  Blood pressure 113/61, pulse 86, temperature 36.6 °C (97.8 °F), temperature source Temporal, resp. rate 18, height 1.575 m (5' 2\"), weight 55.3 kg (121 lb 14.6 oz), SpO2 94%.  Intake/Output last 3 Shifts:  I/O last 3 completed shifts:  In: 100 (1.8 mL/kg) [IV Piggyback:100]  Out: 625 (11.3 mL/kg) [Urine:625 (0.3 mL/kg/hr)]  Weight: 55.3 kg     Relevant Results           This patient currently has cardiac telemetry ordered; if you would like to modify or discontinue the telemetry order, click here to go to the orders activity to modify/discontinue the order.      Assessment/Plan   Principal Problem:    Sepsis due to pneumonia (Multi)    Sepsis without shock in the Setting of Pneumonia: gradual improving. Leukocytosis is now 17 from 21. Continue current abx. Last documented fever was on 6/8 at 1500 to 1600.     Legionella Pneumonia:  Legionella is positive. Strep is negative. Discontinued doxy and ceftriaxone. Started on Levaquin. Check EKG ( QT) before starting levaquin.  May need to get ID vs infectious control input.  6/10: Infection control is already spoken to patient.  Continue Levaquin.    DM type 2: poorly controlled. Now realized that Endo is following. Will consult diabetic education and defer DM medication to Endo team. Currently on 10 Units of lantus and sliding scale. Noted that she was unable to tolerate Metformin. Initially thought of sending her home on januvia, but will defer to endo  6/10: Appreciate endocrine input.  Likely to withhold metformin on discharge.  Adjust other medications and insulins.  Will reassess in AM.    Acute Respiratory Failure with hypoxia:  resolved. Check ambulatory pulse oxygen at " discharge    Hyponatremia:  possibly due to Hyperglycemia. Continue to monitor. May also be due to recent Legionella infection. No systemic symptoms for now.    Elevated Trop:  this is in the setting of Pna. Suspects demand ischemia. Continue to monitor    Hypokalemia:  resolved    Tobacco Abuse: counseled on cessation    Debility/deconditioning:  pt/OT. Encourage out of bed ambulation.            I spent 30 minutes in the professional and overall care of this patient.      Christopher Padilla MD

## 2024-06-10 NOTE — PROGRESS NOTES
06/10/24 1247   Discharge Planning   Living Arrangements Alone   Support Systems Children   Assistance Needed independnet in bathing, dressing, cooking, cleaning, drives. Recent falls- no use of DME. May need at discharge.   Type of Residence Private residence   Number of Stairs to Enter Residence 0   Number of Stairs Within Residence 0   Do you have animals or pets at home? No   Who is requesting discharge planning? Provider   Home or Post Acute Services None   Patient expects to be discharged to: home   Does the patient need discharge transport arranged? No   Financial Resource Strain   How hard is it for you to pay for the very basics like food, housing, medical care, and heating? Not very   Housing Stability   In the last 12 months, was there a time when you were not able to pay the mortgage or rent on time? N   In the last 12 months, how many places have you lived? 1   In the last 12 months, was there a time when you did not have a steady place to sleep or slept in a shelter (including now)? N   Transportation Needs   In the past 12 months, has lack of transportation kept you from medical appointments or from getting medications? no   In the past 12 months, has lack of transportation kept you from meetings, work, or from getting things needed for daily living? No     REMOTE COVERAGE- Attempted to reach pt by room and personal phone (not working). Called daughter as listed in chart, ;eft vm requesting return call. Daughter returned call, role of TCC explained. Demographics confirmed. PCP is Dr. Anderson- last visit 6/22/23. Pharmacy is [facility]- takes meds as prescribed, able to afford and obtain. Pt phone number is no longer active, new number will be updated. Daughter reports that pt is going to stay with other daughter Carline and suggested a call be made to pt to discuss need for HHC. Daughter does not feel that hhc will be needed but said to check with pt. Daughter reports that pt to go down for MRI  today. Therapy notes suggest low intensity therapy at discharge.  Attempt made to call pt, left VM requesting return call. CT will follow.     1434- second attempt to reach pt room and personal phone without answer. Awaiting return call. Plan- home with daughter. Need to check on [facility] need. CT will follow.

## 2024-06-10 NOTE — PROGRESS NOTES
"Johnny Motta is a 73 y.o. female on day 2 of admission presenting with Sepsis due to pneumonia (Multi).    Subjective   Patient admits to feeling weak.  She did work with physical therapy today.  She is tolerating meals.     I have reviewed histories, allergies and medications have been reviewed and there are no changes       Objective     Physical Exam  Vitals and nursing note reviewed.   Constitutional:       General: She is not in acute distress.     Appearance: Normal appearance. She is normal weight.   HENT:      Head: Normocephalic and atraumatic.      Nose: Nose normal.      Mouth/Throat:      Mouth: Mucous membranes are moist.   Eyes:      Extraocular Movements: Extraocular movements intact.   Pulmonary:      Effort: Pulmonary effort is normal.   Musculoskeletal:         General: Normal range of motion.   Neurological:      Mental Status: She is alert and oriented to person, place, and time.   Psychiatric:         Mood and Affect: Mood normal.         Last Recorded Vitals  Blood pressure 108/65, pulse 98, temperature 37 °C (98.6 °F), temperature source Temporal, resp. rate 18, height 1.575 m (5' 2\"), weight 55.3 kg (121 lb 14.6 oz), SpO2 93%.  Intake/Output last 3 Shifts:  I/O last 3 completed shifts:  In: 100 (1.8 mL/kg) [IV Piggyback:100]  Out: 625 (11.3 mL/kg) [Urine:625 (0.3 mL/kg/hr)]  Weight: 55.3 kg     Relevant Results  Results from last 7 days   Lab Units 06/10/24  1120 06/10/24  0728 06/10/24  0409 06/09/24  2051 06/09/24  1559 06/09/24  1200 06/09/24  0826 06/09/24  0000 06/08/24  0804 06/08/24  0515 06/07/24  2114   POCT GLUCOSE mg/dL 232* 217*  --  244* 199* 164*   < >  --    < >  --   --    GLUCOSE mg/dL  --   --  239*  --   --   --   --  224*  --  240* 301*    < > = values in this interval not displayed.     Scheduled medications  atorvastatin, 20 mg, oral, Nightly  glimepiride, 4 mg, oral, BID AC  heparin (porcine), 5,000 Units, subcutaneous, q8h  insulin glargine, 12 Units, subcutaneous, " Nightly  insulin lispro, 0-10 Units, subcutaneous, TID  levoFLOXacin, 500 mg, intravenous, q24h      Continuous medications     PRN medications  PRN medications: acetaminophen, dextrose, dextrose, glucagon, glucagon, ondansetron    Results for orders placed or performed during the hospital encounter of 06/07/24 (from the past 24 hour(s))   POCT GLUCOSE   Result Value Ref Range    POCT Glucose 244 (H) 74 - 99 mg/dL   Renal function panel   Result Value Ref Range    Glucose 239 (H) 74 - 99 mg/dL    Sodium 133 (L) 136 - 145 mmol/L    Potassium 3.1 (L) 3.5 - 5.3 mmol/L    Chloride 97 (L) 98 - 107 mmol/L    Bicarbonate 29 21 - 32 mmol/L    Anion Gap 10 10 - 20 mmol/L    Urea Nitrogen 12 6 - 23 mg/dL    Creatinine 0.50 0.50 - 1.05 mg/dL    eGFR >90 >60 mL/min/1.73m*2    Calcium 8.7 8.6 - 10.3 mg/dL    Phosphorus 1.5 (L) 2.5 - 4.9 mg/dL    Albumin 2.6 (L) 3.4 - 5.0 g/dL   CBC   Result Value Ref Range    WBC 13.1 (H) 4.4 - 11.3 x10*3/uL    nRBC 0.0 0.0 - 0.0 /100 WBCs    RBC 4.03 4.00 - 5.20 x10*6/uL    Hemoglobin 11.9 (L) 12.0 - 16.0 g/dL    Hematocrit 34.4 (L) 36.0 - 46.0 %    MCV 85 80 - 100 fL    MCH 29.5 26.0 - 34.0 pg    MCHC 34.6 32.0 - 36.0 g/dL    RDW 13.2 11.5 - 14.5 %    Platelets 194 150 - 450 x10*3/uL   POCT GLUCOSE   Result Value Ref Range    POCT Glucose 217 (H) 74 - 99 mg/dL   POCT GLUCOSE   Result Value Ref Range    POCT Glucose 232 (H) 74 - 99 mg/dL   POCT GLUCOSE   Result Value Ref Range    POCT Glucose 120 (H) 74 - 99 mg/dL      ECG 12 Lead    Result Date: 6/10/2024  Sinus tachycardia with Premature supraventricular complexes Right bundle branch block Abnormal ECG When compared with ECG of 09-JUN-2024 10:18, (unconfirmed) No significant change was found Confirmed by Wily Peck ([id number]) on 6/10/2024 5:03:47 PM    Electrocardiogram, 12-lead PRN ACS symptoms    Result Date: 6/10/2024  Sinus rhythm Left atrial enlargement Right bundle branch block Minimal ST elevation, inferior leads Baseline wander  in lead(s) I,II,III,aVR,aVF,V3,V4    ECG 12 lead    Result Date: 6/10/2024  Sinus or ectopic atrial tachycardia Left atrial enlargement Nonspecific intraventricular conduction delay Posterior infarct, acute (LCx) >>> Acute MI <<<    CT abdomen pelvis w IV contrast    Result Date: 6/7/2024  Interpreted By:  Juan Jose Montelongo, STUDY: CT ABDOMEN PELVIS W IV CONTRAST;  6/7/2024 10:19 pm   INDICATION: Signs/Symptoms:abdominal pain.   COMPARISON: None.   ACCESSION NUMBER(S): [id number]   ORDERING CLINICIAN: FIDELIA GRUBBS   TECHNIQUE: Axial CT images of the abdomen and pelvis with coronal and sagittal reconstructed images obtained after intravenous administration of 75 mL of Omnipaque 350   FINDINGS: LOWER CHEST: Dense consolidative opacity in the left lower lobe concerning for developing pneumonia.   ABDOMEN:   LIVER: Normal morphology. Liver is hypodense relative to the spleen suggestive of steatosis. Subcentimeter hypodense focus in the hepatic dome is too small to characterize. BILE DUCTS: Normal caliber. GALLBLADDER: Status post cholecystectomy. PANCREAS: Within normal limits. SPLEEN: Within normal limits. ADRENALS: There is a 1.8 cm left adrenal nodule which is incompletely characterized on this monophasic study. Right adrenal glands within normal limits. KIDNEYS and URETERS: Symmetric renal enhancement. No hydronephrosis or perinephric fluid collection. Several hypodense left renal lesions demonstrate fluid attenuation, largest measuring up to 1.3 cm compatible with cysts. Additional subcentimeter hypodense foci are too small to characterize.   VESSELS:  Calcific atherosclerosis of the aortoiliac and mesenteric vessels. No aortic aneurysm. RETROPERITONEUM: No pathologically enlarged retroperitoneal lymph nodes.   PELVIS:   REPRODUCTIVE ORGANS: Uterus is present. No adnexal mass. BLADDER: Within normal limits.   BOWEL: Stomach is under distended with apparent wall thickening. Visualized loops of bowel are without  evidence for obstruction. Moderate stool burden. Scattered colonic diverticula without evidence for acute diverticulitis. No pneumatosis or portal venous gas. Normal appendix. PERITONEUM: No ascites or free air, no fluid collection.   ABDOMINAL WALL: Within normal limits. BONES: Multilevel degenerative changes of the spine.       No acute abdominal or pelvic process.   Dense consolidative opacity in the left lower lobe concerning for developing pneumonia. Posttreatment follow-up is recommended to ensure complete resolution.   There is a 1.8 cm left adrenal nodule which is incompletely characterized on this monophasic study. This can be further evaluated with nonemergent adrenal washout CT or MRI.   Scattered colonic diverticula without evidence for acute diverticulitis.   Additional findings as described above.   MACRO: None   Signed by: Juan Jose Montelongo 6/7/2024 11:04 PM Dictation workstation:   [id number]    XR chest 2 views    Result Date: 6/7/2024  Interpreted By:  Juan Jose Montelongo, STUDY: XR CHEST 2 VIEWS;  6/7/2024 10:06 pm   INDICATION: Signs/Symptoms:cough.   COMPARISON: Chest x-ray 04/11/2024   ACCESSION NUMBER(S): [id number]   ORDERING CLINICIAN: FIDELIA GRUBBS   FINDINGS:     CARDIOMEDIASTINAL SILHOUETTE: Cardiomediastinal silhouette is normal in size and configuration.   LUNGS: Retrocardiac airspace opacity concerning for developing pneumonia. Right lung is clear. No sizeable effusion or pneumothorax.   ABDOMEN: Multiple surgical clips noted in the upper abdomen.   BONES: Multilevel degenerative changes of the spine.       There is retrocardiac airspace opacity concerning for developing pneumonia. Posttreatment follow-up is recommended to ensure complete resolution and exclude underlying lesion.   MACRO: None   Signed by: Juan Jose Montelongo 6/7/2024 10:41 PM Dictation workstation:   [id number]      Assessment/Plan   Principal Problem:    Sepsis due to pneumonia (Multi)    IMPRESSION  Poorly controlled  tyep II DM  A1C of 11.6%     Recommendations:  To continue Lantus 12 units subcutaneous bedtime   To continue insulin correction scale TID AC   To increase glimepiride to 4mg twice daily   Diabetic diet as tolerated   [facility]    Hypoglycemic protocol   Counseled that metformin therapy will not be resumed upon discharge given GI symptoms  Counseled that the goal A1C should be 7% or less  Counseled glycemic control is warranted to prevent microvascular complications  Will continue to follow          Sherly Moreno MD

## 2024-06-10 NOTE — PROGRESS NOTES
Occupational Therapy    Evaluation    Patient Name: Johnny Motta  MRN: [id number]  Today's Date: 6/10/2024  Time Calculation  Start Time: 1340  Stop Time: 1352  Time Calculation (min): 12 min  2009/2009-A    Assessment  IP OT Assessment  OT Assessment: CGA  ADLs and amb. wtih FWW  End of Session Patient Position: Bed, 1 rail up       06/10/24 1340   Inpatient Plan   No Skilled OT No acute OT goals identified   OT - OK to Discharge Yes  ((when mediclaly approp.))   OT Discharge Recommendations No further acute OT   OT Recommended Transfer Status Stand by assist     Subjective     Current Problem:  1. Sepsis due to pneumonia (Multi)            General:  General  Reason for Referral: sepsis 2/2 pna  Referred By: Gurpreet  Past Medical History Relevant to Rehab: Hx. of angina, NIDDM  Family/Caregiver Present: No  Patient Position Received: Bed, 1 rail up  General Comment: pt. pleasant, cooperative, thankful for OOB activity , states more comfortable using FWW now    Precautions:  Medical Precautions: Fall precautions    Vital Signs: see nurses notes        Pain:  Pain Assessment  Pain Assessment: 0-10  Pain Score: 0 - No pain    Objective     Cognition:  Overall Cognitive Status: Within Functional Limits             Home Living:  Type of Home: House (states going to [facility]'s home at discharge)  Lives With: Alone  Home Adaptive Equipment: None  Home Layout: One level     Prior Function:  Level of [place]: Independent with ADLs and functional transfers, Independent with homemaking with ambulation  ADL Assistance: Independent  Homemaking Assistance: Independent  Ambulatory Assistance: Independent  Leisure: enjoys spending time with grandchildren    IADL History:  Homemaking Responsibilities: Yes  Meal Prep Responsibility: Primary  Laundry Responsibility: Primary  Cleaning Responsibility: Primary    ADL:  LE Dressing Assistance: Independent  Toileting Assistance with Device:  (CGA amb. with FWW)    Activity  Tolerance:  Endurance: Endurance does not limit participation in activity    Bed Mobility/Transfers:      Transfers  Transfer: Yes (SBA)    Ambulation/Gait Training:  Functional Mobility  Functional Mobility Performed: Yes (CGA amb. in room and hallway)         Standing Balance:  Dynamic Standing Balance  Dynamic Standing-Balance Support:  (CGA)     Sensation:  Light Touch: No apparent deficits    Strength:  Strength Comments: Ashley CONTEH    Perception:  Inattention/Neglect: Appears intact    Coordination:  Movements are Fluid and Coordinated: Yes          Outcome Measures: [facility] Daily Activity  Putting on and taking off regular lower body clothing: None  Bathing (including washing, rinsing, drying): A little  Putting on and taking off regular upper body clothing: None  Toileting, which includes using toilet, bedpan or urinal: A little  Taking care of personal grooming such as brushing teeth: None  Eating Meals: None  Daily Activity - Total Score: 22                    EDUCATION:     Education Documentation  ADL Training, taught by Lety Olivier OT at 6/10/2024  3:52 PM.  Learner: Patient  Readiness: Acceptance  Method: Demonstration  Response: Demonstrated Understanding  Comment: safety with FWW

## 2024-06-11 ENCOUNTER — PHARMACY VISIT (OUTPATIENT)
Dept: PHARMACY | Facility: CLINIC | Age: 74
End: 2024-06-11
Payer: COMMERCIAL

## 2024-06-11 VITALS
DIASTOLIC BLOOD PRESSURE: 68 MMHG | RESPIRATION RATE: 17 BRPM | HEART RATE: 79 BPM | OXYGEN SATURATION: 96 % | TEMPERATURE: 96.8 F | HEIGHT: 62 IN | WEIGHT: 120.15 LBS | SYSTOLIC BLOOD PRESSURE: 103 MMHG | BODY MASS INDEX: 22.11 KG/M2

## 2024-06-11 PROBLEM — A41.9 SEPSIS DUE TO PNEUMONIA (MULTI): Status: RESOLVED | Noted: 2024-06-07 | Resolved: 2024-06-11

## 2024-06-11 PROBLEM — J18.9 SEPSIS DUE TO PNEUMONIA (MULTI): Status: RESOLVED | Noted: 2024-06-07 | Resolved: 2024-06-11

## 2024-06-11 PROBLEM — E11.65 CONTROLLED TYPE 2 DIABETES MELLITUS WITH HYPERGLYCEMIA (MULTI): Status: ACTIVE | Noted: 2024-06-11

## 2024-06-11 PROBLEM — A48.1 LEGIONELLA PNEUMONIA (MULTI): Status: ACTIVE | Noted: 2024-06-11

## 2024-06-11 LAB
ALBUMIN SERPL BCP-MCNC: 2.8 G/DL (ref 3.4–5)
ALP SERPL-CCNC: 79 U/L (ref 33–136)
ALT SERPL W P-5'-P-CCNC: 45 U/L (ref 7–45)
ANION GAP SERPL CALC-SCNC: 10 MMOL/L (ref 10–20)
AST SERPL W P-5'-P-CCNC: 73 U/L (ref 9–39)
BILIRUB SERPL-MCNC: 0.4 MG/DL (ref 0–1.2)
BUN SERPL-MCNC: 14 MG/DL (ref 6–23)
CALCIUM SERPL-MCNC: 9.1 MG/DL (ref 8.6–10.3)
CHLORIDE SERPL-SCNC: 100 MMOL/L (ref 98–107)
CO2 SERPL-SCNC: 31 MMOL/L (ref 21–32)
CREAT SERPL-MCNC: 0.53 MG/DL (ref 0.5–1.05)
EGFRCR SERPLBLD CKD-EPI 2021: >90 ML/MIN/1.73M*2
ERYTHROCYTE [DISTWIDTH] IN BLOOD BY AUTOMATED COUNT: 13.2 % (ref 11.5–14.5)
GLUCOSE BLD MANUAL STRIP-MCNC: 112 MG/DL (ref 74–99)
GLUCOSE BLD MANUAL STRIP-MCNC: 176 MG/DL (ref 74–99)
GLUCOSE SERPL-MCNC: 147 MG/DL (ref 74–99)
HCT VFR BLD AUTO: 35.7 % (ref 36–46)
HGB BLD-MCNC: 12.2 G/DL (ref 12–16)
MCH RBC QN AUTO: 29 PG (ref 26–34)
MCHC RBC AUTO-ENTMCNC: 34.2 G/DL (ref 32–36)
MCV RBC AUTO: 85 FL (ref 80–100)
NRBC BLD-RTO: 0 /100 WBCS (ref 0–0)
PHOSPHATE SERPL-MCNC: 3.3 MG/DL (ref 2.5–4.9)
PLATELET # BLD AUTO: 231 X10*3/UL (ref 150–450)
POTASSIUM SERPL-SCNC: 3.2 MMOL/L (ref 3.5–5.3)
PROT SERPL-MCNC: 5.7 G/DL (ref 6.4–8.2)
RBC # BLD AUTO: 4.2 X10*6/UL (ref 4–5.2)
SODIUM SERPL-SCNC: 138 MMOL/L (ref 136–145)
WBC # BLD AUTO: 10.1 X10*3/UL (ref 4.4–11.3)

## 2024-06-11 PROCEDURE — 36415 COLL VENOUS BLD VENIPUNCTURE: CPT | Performed by: INTERNAL MEDICINE

## 2024-06-11 PROCEDURE — 2500000002 HC RX 250 W HCPCS SELF ADMINISTERED DRUGS (ALT 637 FOR MEDICARE OP, ALT 636 FOR OP/ED): Performed by: INTERNAL MEDICINE

## 2024-06-11 PROCEDURE — 97110 THERAPEUTIC EXERCISES: CPT | Mod: GP,CQ | Performed by: PHYSICAL THERAPY ASSISTANT

## 2024-06-11 PROCEDURE — RXMED WILLOW AMBULATORY MEDICATION CHARGE

## 2024-06-11 PROCEDURE — 85027 COMPLETE CBC AUTOMATED: CPT | Performed by: INTERNAL MEDICINE

## 2024-06-11 PROCEDURE — 82947 ASSAY GLUCOSE BLOOD QUANT: CPT

## 2024-06-11 PROCEDURE — 80053 COMPREHEN METABOLIC PANEL: CPT | Performed by: INTERNAL MEDICINE

## 2024-06-11 PROCEDURE — 84100 ASSAY OF PHOSPHORUS: CPT | Performed by: INTERNAL MEDICINE

## 2024-06-11 PROCEDURE — 2500000004 HC RX 250 GENERAL PHARMACY W/ HCPCS (ALT 636 FOR OP/ED): Performed by: INTERNAL MEDICINE

## 2024-06-11 PROCEDURE — 99239 HOSP IP/OBS DSCHRG MGMT >30: CPT | Performed by: INTERNAL MEDICINE

## 2024-06-11 PROCEDURE — 2500000001 HC RX 250 WO HCPCS SELF ADMINISTERED DRUGS (ALT 637 FOR MEDICARE OP): Performed by: INTERNAL MEDICINE

## 2024-06-11 PROCEDURE — 99232 SBSQ HOSP IP/OBS MODERATE 35: CPT | Performed by: INTERNAL MEDICINE

## 2024-06-11 PROCEDURE — 97116 GAIT TRAINING THERAPY: CPT | Mod: GP,CQ | Performed by: PHYSICAL THERAPY ASSISTANT

## 2024-06-11 PROCEDURE — 84075 ASSAY ALKALINE PHOSPHATASE: CPT | Performed by: INTERNAL MEDICINE

## 2024-06-11 RX ORDER — INSULIN GLARGINE 100 [IU]/ML
12 INJECTION, SOLUTION SUBCUTANEOUS NIGHTLY
Qty: 15 ML | Refills: 1 | Status: SHIPPED | OUTPATIENT
Start: 2024-06-11

## 2024-06-11 RX ORDER — POTASSIUM CHLORIDE 20 MEQ/1
40 TABLET, EXTENDED RELEASE ORAL ONCE
Status: COMPLETED | OUTPATIENT
Start: 2024-06-11 | End: 2024-06-11

## 2024-06-11 RX ORDER — PEN NEEDLE, DIABETIC 30 GX3/16"
1 NEEDLE, DISPOSABLE MISCELLANEOUS DAILY
Qty: 100 EACH | Refills: 0 | OUTPATIENT
Start: 2024-06-11

## 2024-06-11 RX ORDER — INSULIN LISPRO 100 [IU]/ML
INJECTION, SOLUTION INTRAVENOUS; SUBCUTANEOUS
Qty: 15 ML | Refills: 0 | OUTPATIENT
Start: 2024-06-11 | End: 2024-06-11 | Stop reason: HOSPADM

## 2024-06-11 RX ORDER — LEVOFLOXACIN 500 MG/1
500 TABLET, FILM COATED ORAL DAILY
Qty: 4 TABLET | Refills: 0 | Status: SHIPPED | OUTPATIENT
Start: 2024-06-11 | End: 2024-06-15

## 2024-06-11 RX ORDER — INSULIN LISPRO 100 [IU]/ML
0-10 INJECTION, SOLUTION INTRAVENOUS; SUBCUTANEOUS
Start: 2024-06-11 | End: 2024-06-12 | Stop reason: HOSPADM

## 2024-06-11 RX ORDER — GLIMEPIRIDE 4 MG/1
4 TABLET ORAL
Qty: 60 TABLET | Refills: 0 | Status: SHIPPED | OUTPATIENT
Start: 2024-06-11 | End: 2024-07-11

## 2024-06-11 RX ADMIN — GLIMEPIRIDE 4 MG: 4 TABLET ORAL at 06:06

## 2024-06-11 RX ADMIN — LEVOFLOXACIN 500 MG: 500 INJECTION, SOLUTION INTRAVENOUS at 09:17

## 2024-06-11 RX ADMIN — HEPARIN SODIUM 5000 UNITS: 5000 INJECTION INTRAVENOUS; SUBCUTANEOUS at 06:06

## 2024-06-11 RX ADMIN — POTASSIUM CHLORIDE 40 MEQ: 1500 TABLET, EXTENDED RELEASE ORAL at 09:15

## 2024-06-11 ASSESSMENT — COGNITIVE AND FUNCTIONAL STATUS - GENERAL
STANDING UP FROM CHAIR USING ARMS: A LITTLE
DAILY ACTIVITIY SCORE: 22
MOVING TO AND FROM BED TO CHAIR: A LITTLE
TOILETING: A LITTLE
MOBILITY SCORE: 19
MOVING TO AND FROM BED TO CHAIR: A LITTLE
CLIMB 3 TO 5 STEPS WITH RAILING: TOTAL
MOBILITY SCORE: 18
WALKING IN HOSPITAL ROOM: A LITTLE
CLIMB 3 TO 5 STEPS WITH RAILING: A LOT
STANDING UP FROM CHAIR USING ARMS: A LITTLE
WALKING IN HOSPITAL ROOM: A LITTLE
HELP NEEDED FOR BATHING: A LITTLE

## 2024-06-11 ASSESSMENT — PAIN SCALES - GENERAL
PAINLEVEL_OUTOF10: 0 - NO PAIN
PAINLEVEL_OUTOF10: 0 - NO PAIN

## 2024-06-11 ASSESSMENT — PAIN - FUNCTIONAL ASSESSMENT: PAIN_FUNCTIONAL_ASSESSMENT: 0-10

## 2024-06-11 NOTE — PROGRESS NOTES
"Johnny Motta is a 73 y.o. female on day 3 of admission presenting with Sepsis due to pneumonia (Multi).    Subjective   Patient has no complaints.  She is eating breakfast.       I have reviewed histories, allergies and medications have been reviewed and there are no changes       Objective     Physical Exam  Vitals and nursing note reviewed.   Constitutional:       General: She is not in acute distress.     Appearance: Normal appearance. She is normal weight.   HENT:      Head: Normocephalic and atraumatic.      Nose: Nose normal.      Mouth/Throat:      Mouth: Mucous membranes are moist.   Eyes:      Extraocular Movements: Extraocular movements intact.   Pulmonary:      Effort: Pulmonary effort is normal.   Musculoskeletal:         General: Normal range of motion.   Neurological:      Mental Status: She is alert and oriented to person, place, and time.   Psychiatric:         Mood and Affect: Mood normal.         Last Recorded Vitals  Blood pressure 102/63, pulse 86, temperature 36.1 °C (97 °F), temperature source Temporal, resp. rate 18, height 1.575 m (5' 2\"), weight 54.5 kg (120 lb 2.4 oz), SpO2 94%.  Intake/Output last 3 Shifts:  No intake/output data recorded.    Relevant Results  Results from last 7 days   Lab Units 06/11/24  0509 06/10/24  2008 06/10/24  1509 06/10/24  1120 06/10/24  0728 06/10/24  0409 06/09/24  2051 06/09/24  0826 06/09/24  0000 06/08/24  0804 06/08/24  0515 06/07/24  2114   POCT GLUCOSE mg/dL  --  101* 120* 232* 217*  --  244*   < >  --    < >  --   --    GLUCOSE mg/dL 147*  --   --   --   --  239*  --   --  224*  --  240* 301*    < > = values in this interval not displayed.     Scheduled medications  atorvastatin, 20 mg, oral, Nightly  glimepiride, 4 mg, oral, BID AC  heparin (porcine), 5,000 Units, subcutaneous, q8h  insulin glargine, 12 Units, subcutaneous, Nightly  insulin lispro, 0-10 Units, subcutaneous, TID  levoFLOXacin, 500 mg, intravenous, q24h      Continuous medications   "   PRN medications  PRN medications: acetaminophen, dextrose, dextrose, glucagon, glucagon, ondansetron    Results for orders placed or performed during the hospital encounter of 06/07/24 (from the past 24 hour(s))   POCT GLUCOSE   Result Value Ref Range    POCT Glucose 120 (H) 74 - 99 mg/dL   POCT GLUCOSE   Result Value Ref Range    POCT Glucose 101 (H) 74 - 99 mg/dL   CBC   Result Value Ref Range    WBC 10.1 4.4 - 11.3 x10*3/uL    nRBC 0.0 0.0 - 0.0 /100 WBCs    RBC 4.20 4.00 - 5.20 x10*6/uL    Hemoglobin 12.2 12.0 - 16.0 g/dL    Hematocrit 35.7 (L) 36.0 - 46.0 %    MCV 85 80 - 100 fL    MCH 29.0 26.0 - 34.0 pg    MCHC 34.2 32.0 - 36.0 g/dL    RDW 13.2 11.5 - 14.5 %    Platelets 231 150 - 450 x10*3/uL   Comprehensive Metabolic Panel   Result Value Ref Range    Glucose 147 (H) 74 - 99 mg/dL    Sodium 138 136 - 145 mmol/L    Potassium 3.2 (L) 3.5 - 5.3 mmol/L    Chloride 100 98 - 107 mmol/L    Bicarbonate 31 21 - 32 mmol/L    Anion Gap 10 10 - 20 mmol/L    Urea Nitrogen 14 6 - 23 mg/dL    Creatinine 0.53 0.50 - 1.05 mg/dL    eGFR >90 >60 mL/min/1.73m*2    Calcium 9.1 8.6 - 10.3 mg/dL    Albumin 2.8 (L) 3.4 - 5.0 g/dL    Alkaline Phosphatase 79 33 - 136 U/L    Total Protein 5.7 (L) 6.4 - 8.2 g/dL    AST 73 (H) 9 - 39 U/L    Bilirubin, Total 0.4 0.0 - 1.2 mg/dL    ALT 45 7 - 45 U/L   Phosphorus   Result Value Ref Range    Phosphorus 3.3 2.5 - 4.9 mg/dL   POCT GLUCOSE   Result Value Ref Range    POCT Glucose 112 (H) 74 - 99 mg/dL   POCT GLUCOSE   Result Value Ref Range    POCT Glucose 176 (H) 74 - 99 mg/dL      ECG 12 Lead    Result Date: 6/10/2024  Sinus tachycardia with Premature supraventricular complexes Right bundle branch block Abnormal ECG When compared with ECG of 09-JUN-2024 10:18, (unconfirmed) No significant change was found Confirmed by Wily Peck ([id number]) on 6/10/2024 5:03:47 PM    Electrocardiogram, 12-lead PRN ACS symptoms    Result Date: 6/10/2024  Sinus rhythm Left atrial enlargement Right bundle  branch block Minimal ST elevation, inferior leads Baseline wander in lead(s) I,II,III,aVR,aVF,V3,V4    ECG 12 lead    Result Date: 6/10/2024  Sinus or ectopic atrial tachycardia Left atrial enlargement Nonspecific intraventricular conduction delay Posterior infarct, acute (LCx) >>> Acute MI <<<    CT abdomen pelvis w IV contrast    Result Date: 6/7/2024  Interpreted By:  Juan Jose Montelongo, STUDY: CT ABDOMEN PELVIS W IV CONTRAST;  6/7/2024 10:19 pm   INDICATION: Signs/Symptoms:abdominal pain.   COMPARISON: None.   ACCESSION NUMBER(S): [id number]   ORDERING CLINICIAN: FIDELIA GRUBBS   TECHNIQUE: Axial CT images of the abdomen and pelvis with coronal and sagittal reconstructed images obtained after intravenous administration of 75 mL of Omnipaque 350   FINDINGS: LOWER CHEST: Dense consolidative opacity in the left lower lobe concerning for developing pneumonia.   ABDOMEN:   LIVER: Normal morphology. Liver is hypodense relative to the spleen suggestive of steatosis. Subcentimeter hypodense focus in the hepatic dome is too small to characterize. BILE DUCTS: Normal caliber. GALLBLADDER: Status post cholecystectomy. PANCREAS: Within normal limits. SPLEEN: Within normal limits. ADRENALS: There is a 1.8 cm left adrenal nodule which is incompletely characterized on this monophasic study. Right adrenal glands within normal limits. KIDNEYS and URETERS: Symmetric renal enhancement. No hydronephrosis or perinephric fluid collection. Several hypodense left renal lesions demonstrate fluid attenuation, largest measuring up to 1.3 cm compatible with cysts. Additional subcentimeter hypodense foci are too small to characterize.   VESSELS:  Calcific atherosclerosis of the aortoiliac and mesenteric vessels. No aortic aneurysm. RETROPERITONEUM: No pathologically enlarged retroperitoneal lymph nodes.   PELVIS:   REPRODUCTIVE ORGANS: Uterus is present. No adnexal mass. BLADDER: Within normal limits.   BOWEL: Stomach is under distended  with apparent wall thickening. Visualized loops of bowel are without evidence for obstruction. Moderate stool burden. Scattered colonic diverticula without evidence for acute diverticulitis. No pneumatosis or portal venous gas. Normal appendix. PERITONEUM: No ascites or free air, no fluid collection.   ABDOMINAL WALL: Within normal limits. BONES: Multilevel degenerative changes of the spine.       No acute abdominal or pelvic process.   Dense consolidative opacity in the left lower lobe concerning for developing pneumonia. Posttreatment follow-up is recommended to ensure complete resolution.   There is a 1.8 cm left adrenal nodule which is incompletely characterized on this monophasic study. This can be further evaluated with nonemergent adrenal washout CT or MRI.   Scattered colonic diverticula without evidence for acute diverticulitis.   Additional findings as described above.   MACRO: None   Signed by: Juan Jose Montelongo 6/7/2024 11:04 PM Dictation workstation:   [id number]    XR chest 2 views    Result Date: 6/7/2024  Interpreted By:  Juan Jose Montelongo, STUDY: XR CHEST 2 VIEWS;  6/7/2024 10:06 pm   INDICATION: Signs/Symptoms:cough.   COMPARISON: Chest x-ray 04/11/2024   ACCESSION NUMBER(S): [id number]   ORDERING CLINICIAN: FIDELIA GRUBBS   FINDINGS:     CARDIOMEDIASTINAL SILHOUETTE: Cardiomediastinal silhouette is normal in size and configuration.   LUNGS: Retrocardiac airspace opacity concerning for developing pneumonia. Right lung is clear. No sizeable effusion or pneumothorax.   ABDOMEN: Multiple surgical clips noted in the upper abdomen.   BONES: Multilevel degenerative changes of the spine.       There is retrocardiac airspace opacity concerning for developing pneumonia. Posttreatment follow-up is recommended to ensure complete resolution and exclude underlying lesion.   MACRO: None   Signed by: Juan Jose Montelongo 6/7/2024 10:41 PM Dictation workstation:   [id number]         Assessment/Plan   Principal  Problem:    Sepsis due to pneumonia (Multi)    IMPRESSION  Poorly controlled tyep II DM  A1C of 11.6%     Recommendations:  To continue Lantus 12 units subcutaneous bedtime   To continue insulin correction scale TID AC   To continue glimepiride 4mg twice daily   Diabetic diet as tolerated   [facility]    Hypoglycemic protocol   Counseled that metformin therapy will not be resumed upon discharge given GI symptoms  Will continue to follow         Sherly Moreno MD

## 2024-06-11 NOTE — PROGRESS NOTES
Physical Therapy    Physical Therapy Treatment    Patient Name: Johnny Motta  MRN: [id number]  Today's Date: 6/11/2024  Time Calculation  Start Time: 0641  Stop Time: 0704  Time Calculation (min): 23 min    Assessment/Plan   PT Assessment  End of Session Communication: Bedside nurse  Assessment Comment: pt demonstrating good motivation and progress with PT. pt is still occasionally unsafe with walker which required cues to correct.  End of Session Patient Position: Up in chair, Alarm on     PT Plan  Treatment/Interventions: Bed mobility, Transfer training, Gait training, Stair training, Balance training, Endurance training, Therapeutic exercise, Therapeutic activity  PT Plan: Skilled PT  PT Frequency: 5 times per week  PT Discharge Recommendations: Low intensity level of continued care (Daughter( works from home) has offered for pt to recover at her home vs. a rehab facility.)  Equipment Recommended upon Discharge:  (May benefit from assistive device, type dependent on recovery and therapy trials.)  PT Recommended Transfer Status: Assist x1  PT - OK to Discharge: Yes (Once medically cleared.)      General Visit Information:   PT  Visit  PT Received On: 06/11/24  Response to Previous Treatment: Patient with no complaints from previous session.  General  Reason for Referral: sepsis 2/2 pna  Referred By: Gurpreet  Past Medical History Relevant to Rehab: Hx. of angina, NIDDM  Prior to Session Communication: Bedside nurse  Patient Position Received: Bed, 1 rail up  Preferred Learning Style: verbal  General Comment: pt agreeable to PT. pt thankful for mobility and exercises. pt hoping to dc to daughters today.      Precautions:  Precautions  Medical Precautions: Fall precautions  Precautions Comment: Fall precautions. Monitor PSO2 & HR with activity. Monitor intake/output.           Pain:  Pain Assessment  Pain Assessment: 0-10  Pain Score: 0 - No pain  Cognition:  Cognition  Overall Cognitive Status: Within Functional  Limits            Treatments:  Therapeutic Exercise  Therapeutic Exercise Performed: Yes (performed seated BLE .Education provided on technique.)  Therapeutic Exercise Activity 1: ankle pumps x 15 ea  Therapeutic Exercise Activity 2: Marches x 15 ea  Therapeutic Exercise Activity 3: LAQ x15 ea  Therapeutic Exercise Activity 4: Hip ab/adduction x 15 ea  Therapeutic Exercise Activity 5: glut sets x 15 ea    Bed Mobility  Bed Mobility: Yes  Bed Mobility 1  Bed Mobility 1: Supine to sitting  Level of Assistance 1: Distant supervision  Bed Mobility Comments 1: pt with good technique and only required occasional cues for safety.    Ambulation/Gait Training  Ambulation/Gait Training Performed: Yes  Ambulation/Gait Training 1  Surface 1: Level tile  Device 1: Rolling walker  Assistance 1: Contact guard  Quality of Gait 1: Narrow base of support  Comments/Distance (ft) 1: pt ambulated 75'x1 and 35'x2 with cues for safety and proper walker technique. pt is impulsive at times and does not keep walker with her. pt corrects with cues.  Transfers  Transfer: Yes  Transfer 1  Technique 1: Sit to stand, Stand to sit  Transfer Device 1: Walker  Transfer Level of Assistance 1: Close supervision  Trials/Comments 1: cues for hand placement  Transfers 2  Technique 2: Stand pivot  Transfer Device 2: Walker  Transfer Level of Assistance 2: Contact guard  Trials/Comments 2: cues for walker safety and technique to decrease fall risk    Outcome Measures:  [facility] Basic Mobility  Turning from your back to your side while in a flat bed without using bedrails: None  Moving from lying on your back to sitting on the side of a flat bed without using bedrails: None  Moving to and from bed to chair (including a wheelchair): A little  Standing up from a chair using your arms (e.g. wheelchair or bedside chair): A little  To walk in hospital room: A little  Climbing 3-5 steps with railing: Total  Basic Mobility - Total Score: 18    Education  Documentation  Mobility Training, taught by Swapna Adame PTA at 6/11/2024  7:24 AM.  Learner: Patient  Readiness: Acceptance  Method: Explanation  Response: Verbalizes Understanding, Needs Reinforcement    Education Comments  No comments found.             Encounter Problems       Encounter Problems (Active)       Pain - Adult          To improve endurance, balance, and safe mobility:       Patient will complete bed mobility and sit <> stand with S/MOD I. (Progressing)       Start:  06/09/24    Expected End:  06/23/24            Patient will complete 5 consecutive stands in MODIFIED 30 SECOND CHAIR RISE test with SBA/S and participate in dynamic standing activities for 4 minutes with PSO2 and HR WNL. (Progressing)       Start:  06/09/24    Expected End:  06/23/24            Patient will ambulate 100 feet with least restrictive assistive device vs none with SBA/S without deviation in path. (Progressing)       Start:  06/09/24    Expected End:  06/23/24            Patient will negotiate 2 steps with 1 rail and SBA to enter her daughter's home. (Not Progressing)       Start:  06/09/24    Expected End:  06/23/24

## 2024-06-11 NOTE — DISCHARGE INSTRUCTIONS
Follow up with PCP in 1-2 weeks. Call to schedule. Bring all your discharge paperwork and medications when you go to your follow up appointment. Return to ED if your symptoms come back.    Follow-up chest x-ray in 3 to 4 weeks.  Defer to PCP.

## 2024-06-11 NOTE — DISCHARGE SUMMARY
Discharge Diagnosis  Sepsis due to pneumonia (Multi)    Issues Requiring Follow-Up  Follow-up with PCP as outpatient.  Follow-up with endocrine as outpatient.  Needs follow-up chest x-ray in 3 to 4 weeks.    Discharge Meds     Your medication list        START taking these medications        Instructions Last Dose Given Next Dose Due   glimepiride 4 mg tablet  Commonly known as: Amaryl      Take 1 tablet (4 mg) by mouth 2 times a day before meals.       insulin lispro 100 unit/mL injection  Commonly known as: HumaLOG      Inject 0-0.1 mL (0-10 Units) under the skin 3 times daily (morning, midday, late afternoon). Take as directed per insulin instructions.       Lantus Solostar U-100 Insulin 100 unit/mL (3 mL) pen  Generic drug: insulin glargine      Inject 12 Units under the skin once daily at bedtime. Take as directed per insulin instructions.       levoFLOXacin 500 mg tablet  Commonly known as: Levaquin      Take 1 tablet (500 mg) by mouth once daily for 4 days.              CONTINUE taking these medications        Instructions Last Dose Given Next Dose Due   aspirin 81 mg EC tablet           atorvastatin 20 mg tablet  Commonly known as: Lipitor           Vitamin D3 50 MCG (2000 UT) tablet  Generic drug: cholecalciferol                  STOP taking these medications      metFORMIN  mg 24 hr tablet  Commonly known as: Glucophage-XR                  Where to Get Your Medications        These medications were sent to [facility]  [address] [place] [id number]      Hours: 8AM to 6PM Mon-Fri, 8AM to 4PM Sat-Sun Phone: [phone]   glimepiride 4 mg tablet  Lantus Solostar U-100 Insulin 100 unit/mL (3 mL) pen  levoFLOXacin 500 mg tablet       Information about where to get these medications is not yet available    Ask your nurse or doctor about these medications  insulin lispro 100 unit/mL injection         Test Results Pending At Discharge  Pending Labs       Order Current Status    Blood  Culture Preliminary result    Blood Culture Preliminary result            Hospital Course   Ms. Motta was seen and examined this morning. She reports poor night sleep and attributed it to being in the hospital. She denied nausea and vomiting.  She denied fever and chills and noted that her last fever was yesterday afternoon.  We discussed her recent labs especially some improvement in her WBC.   We discussed her poorly controlled BGL. She did tell me that she was on metformin in the past and quit taking it about 2 weeks ago due to side effect of upset stomach, abdominal cramping and loose stool. She said she didn't notify her PCP before stopping metforming.  She is agreeable to trial of Januvia at discharge and following up with her PCP.   She was also made aware of the incidental adrenal nodule found on CT of the abdomen. She agreed to additional image with MRI. She denied any foreign metallic object in her body such as pacemaker and insulin pump.     6/10: Patient seen.  Patient apparently has Legionella pneumonia based on antigens.  Switch to Levaquin.  QTc is borderline but sufficient.  Appreciate input from endocrine.  Watching glucose.  Consider discharge tomorrow if improving.  Patient states she feels weak, daughter is bringing a walker and tomorrow.      6/11: Patient seen.  No new complaints.  Breathing easily on room air.  Will discharge home on remainder Levaquin orally.  Needs follow-up chest x-ray in 3 to 4 weeks to follow-up on Legionella pneumonia.  Health department has been notified.  Blood sugars are well-controlled.  Discontinue metformin on discharge.  Placed on Amaryl as well as Lantus and sliding scale for NovoLog.  Rx provided.  Follow-up PCP in 1 to 2 weeks.  May follow-up with endocrine as needed.  Okay to discharge home.    This discharge took greater than 35 minutes to arrange.    Sepsis without shock secondary to Legionella pneumonia.  Resolved.  See below.  Acute respiratory  failure.  Legionella pneumonia.  Discharged on Levaquin x 5 to 7 days.  Will complete treatment orally.  Needs follow-up chest x-ray in 3 to 4 weeks.  Weaned to room air.  DM2 with hyperglycemia.  Side effect secondary to metformin.  Will discharge on increased dose Amaryl, Lantus, NovoLog.  Followed by endocrine.  May follow-up with endocrine as outpatient.  Sugars well-controlled at time of discharge  Acute respiratory failure with hypoxia.  Resolved.  Secondary to pneumonia.  Hyponatremia.  Stable.  Follow-up as outpatient.  Elevated troponin.  Secondary to pneumonia.  Likely mild demand ischemia, consider stress testing as outpatient but patient otherwise asymptomatic present.  Hypokalemia.  Resolved.  Tobacco abuse, counseled on cessation.    Pertinent Physical Exam At Time of Discharge  Physical Exam  Constitutional:       Appearance: Normal appearance.   HENT:      Head: Normocephalic.      Nose: Nose normal.      Mouth/Throat:      Mouth: Mucous membranes are moist.   Eyes:      Extraocular Movements: Extraocular movements intact.      Pupils: Pupils are equal, round, and reactive to light.   Cardiovascular:      Rate and Rhythm: Normal rate and regular rhythm.   Pulmonary:      Effort: Pulmonary effort is normal.      Breath sounds: Normal breath sounds.   Abdominal:      General: Abdomen is flat. Bowel sounds are normal.      Palpations: Abdomen is soft.   Musculoskeletal:         General: Normal range of motion.      Cervical back: Normal range of motion and neck supple.   Skin:     General: Skin is warm and dry.   Neurological:      General: No focal deficit present.      Mental Status: She is alert and oriented to person, place, and time.     Outpatient Follow-Up  No future appointments.      Christopher Padilla MD

## 2024-06-12 ENCOUNTER — PATIENT OUTREACH (OUTPATIENT)
Dept: CARE COORDINATION | Facility: CLINIC | Age: 74
End: 2024-06-12
Payer: COMMERCIAL

## 2024-06-12 LAB
BACTERIA BLD CULT: NORMAL
BACTERIA BLD CULT: NORMAL

## 2024-06-12 RX ORDER — INSULIN LISPRO 100 [IU]/ML
0-10 INJECTION, SOLUTION INTRAVENOUS; SUBCUTANEOUS
Qty: 30 ML | Refills: 1 | Status: SHIPPED | OUTPATIENT
Start: 2024-06-12

## 2024-06-12 NOTE — PROGRESS NOTES
Outreach call to patient to support a smooth transition of care from recent admission. Phone rings, no answer.

## 2024-06-14 NOTE — DOCUMENTATION CLARIFICATION NOTE
"    PATIENT:               LITA THAYER  ACCT #:                  [id number]  MRN:                       [id number]  :                       1950  ADMIT DATE:       2024 8:26 PM  DISCH DATE:        2024 11:40 AM  RESPONDING PROVIDER #:        [id number]          PROVIDER RESPONSE TEXT:    Sepsis with other organ dysfunction Hypoxia    CDI QUERY TEXT:    Clarification          Instruction:  Based on your assessment of the patient and the clinical information, please provide the requested documentation by clicking on the appropriate radio button and enter any additional information if prompted.    Question: Sepsis was documented in the medical record. Based on the documentation and the clinical information, can the diagnosis be further clarified as    When answering this query, please exercise your independent professional judgment. The fact that a question is being asked, does not imply that any particular answer is desired or expected.    The patient's clinical indicators include:  Clinical Information:  - 72 yo female admitted with pneumonia, weakness, elevated troponin, hyperglycemia, leukocytosis, hyponatremia and hypoxia. PMH includes HLD, poorly controlled DM 2 and smoking.    Documented Diagnosis:  Dr Vázquez: \" Sepsis due to pneumonia \"      Clinical Indicators and Documentation:  -Vital Signs on arrival: 102.1, hr 121, rr 16, bp 124/68, sat 90 percent on room air  O2 sat 90-98 percent on room air for stay while at rest, 87 percent with ambulation  -WBC:    21.2  -Microbiology Results: Legionella positive.  -Neutrophil Count/percent Neutrophil:  20.3/ 83.0  -Lactic acid:  1.5  -BUN/Creat:  20/ 0.79  -Blood cultures: NGTD  -Bilirubin:  0.6  -MAP: over 75  -[place] Coma Scale:  154  -PAO2/FIO2:  276  -Procalcitonin:  not tested  -Platelets:  191  -Other clinical indicators:  Troponin 60, 67, 66.    Treatment: Doxycycline, Rocephin, Levaquin, 1470 ml IVF bolus.    Risk Factors:  Legionella, elevated " troponin, electrolyte abnormalities, hypoxia.  Options provided:  -- Sepsis was a differential diagnosis and ruled out after study  -- Sepsis with other organ dysfunction, Please specify sepsis associated organ dysfunction below  -- Other - I will add my own diagnosis  -- Refer to Clinical Documentation Reviewer    Query created by: Myrtle Parra on 6/11/2024 10:55 AM      Electronically signed by:  MAGEN VENTURA MD 6/13/2024 9:41 PM

## 2024-06-14 NOTE — DOCUMENTATION CLARIFICATION NOTE
"    PATIENT:               LITA THAYER  ACCT #:                  [id number]  MRN:                       [id number]  :                       1950  ADMIT DATE:       2024 8:26 PM  DISCH DATE:        2024 11:40 AM  RESPONDING PROVIDER #:        [id number]          PROVIDER RESPONSE TEXT:    Acute Respiratory Failure ruled out after further workup    CDI QUERY TEXT:    Clarification      Instruction:    Based on your assessment of the patient and the clinical information, please provide the requested documentation by clicking on the appropriate radio button and enter any additional information if prompted.    Question: Please clarify diagnosis of respiratory failure as    When answering this query, please exercise your independent professional judgment. The fact that a question is being asked, does not imply that any particular answer is desired or expected.    The patient's clinical indicators include:  Clinical Information:  - 74 yo female admitted with pneumonia, weakness, elevated troponin, hyperglycemia, leukocytosis, hyponatremia and hypoxia. PMH includes HLD, poorly controlled DM 2 and smoking.    Documented Diagnosis:  Dr Quiroz: \" Acute Respiratory Failure with hypoxia:  resolved. \"    -  Dr Padilla : \" Acute respiratory failure with hypoxia.  Resolved.  Secondary to pneumonia.\"    Clinical Indicators and Documentation:  -Vital Signs on arrival: 102.1, hr 121, rr 16, bp 124/68, sat 90 percent on room air  O2 sat 90-98 percent on room air for stay while at rest, 87 percent with ambulation  -ABG results: not tested  -Physical Exam Findings: ED Dr Rodriguez on arrival: BBS normal, no resp distress, pulm effort is normal.  H&P  Dr Vázquez : \" Diminished breath sounds throughout all lung fields with very patchy expiratory wheezing noted throughout both faint and there is notable rhonchi in bilateral lower lung fields left definitely greater than the right, no increased work of breathing at rest or with " "discussion\"  -Distress: none noted  -Cyanosis:  none noted  -Oxygen Therapy:  none  -Pulmonary Consult:  none    Treatment: Doxycycline, Rocephin, Levaquin, 1470 ml IVF bolus.    Risk Factors:  Legionella, smoking.  Options provided:  -- Acute Respiratory Failure ruled out after further workup  -- Acute Respiratory Failure as evidenced by, Please specify additional information below  -- Other - I will add my own diagnosis  -- Refer to Clinical Documentation Reviewer    Query created by: Myrtle Parra on 6/11/2024 10:46 AM      Electronically signed by:  MAGEN VENTURA MD 6/13/2024 9:41 PM          "

## 2024-06-15 ENCOUNTER — APPOINTMENT (OUTPATIENT)
Dept: RADIOLOGY | Facility: HOSPITAL | Age: 74
End: 2024-06-15
Payer: COMMERCIAL

## 2024-06-15 ENCOUNTER — APPOINTMENT (OUTPATIENT)
Dept: CARDIOLOGY | Facility: HOSPITAL | Age: 74
End: 2024-06-15
Payer: COMMERCIAL

## 2024-06-15 ENCOUNTER — HOSPITAL ENCOUNTER (EMERGENCY)
Facility: HOSPITAL | Age: 74
Discharge: HOME | End: 2024-06-16
Attending: STUDENT IN AN ORGANIZED HEALTH CARE EDUCATION/TRAINING PROGRAM
Payer: COMMERCIAL

## 2024-06-15 DIAGNOSIS — R42 LIGHTHEADEDNESS: Primary | ICD-10-CM

## 2024-06-15 LAB
ALBUMIN SERPL BCP-MCNC: 3.1 G/DL (ref 3.4–5)
ALP SERPL-CCNC: 71 U/L (ref 33–136)
ALT SERPL W P-5'-P-CCNC: 41 U/L (ref 7–45)
ANION GAP SERPL CALC-SCNC: 12 MMOL/L (ref 10–20)
APPEARANCE UR: CLEAR
APTT PPP: 31 SECONDS (ref 27–38)
AST SERPL W P-5'-P-CCNC: 25 U/L (ref 9–39)
BASO STIPL BLD QL SMEAR: PRESENT
BASOPHILS # BLD AUTO: 0.04 X10*3/UL (ref 0–0.1)
BASOPHILS NFR BLD AUTO: 0.3 %
BILIRUB SERPL-MCNC: 0.4 MG/DL (ref 0–1.2)
BILIRUB UR STRIP.AUTO-MCNC: NEGATIVE MG/DL
BNP SERPL-MCNC: 69 PG/ML (ref 0–99)
BUN SERPL-MCNC: 19 MG/DL (ref 6–23)
CALCIUM SERPL-MCNC: 9.5 MG/DL (ref 8.6–10.3)
CARDIAC TROPONIN I PNL SERPL HS: 11 NG/L (ref 0–13)
CARDIAC TROPONIN I PNL SERPL HS: 9 NG/L (ref 0–13)
CHLORIDE SERPL-SCNC: 105 MMOL/L (ref 98–107)
CO2 SERPL-SCNC: 27 MMOL/L (ref 21–32)
COLOR UR: YELLOW
CREAT SERPL-MCNC: 0.79 MG/DL (ref 0.5–1.05)
EGFRCR SERPLBLD CKD-EPI 2021: 78 ML/MIN/1.73M*2
EOSINOPHIL # BLD AUTO: 0.1 X10*3/UL (ref 0–0.4)
EOSINOPHIL NFR BLD AUTO: 0.9 %
ERYTHROCYTE [DISTWIDTH] IN BLOOD BY AUTOMATED COUNT: 14.2 % (ref 11.5–14.5)
GLUCOSE BLD MANUAL STRIP-MCNC: 73 MG/DL (ref 74–99)
GLUCOSE SERPL-MCNC: 132 MG/DL (ref 74–99)
GLUCOSE UR STRIP.AUTO-MCNC: NEGATIVE MG/DL
HCT VFR BLD AUTO: 34.8 % (ref 36–46)
HGB BLD-MCNC: 11.6 G/DL (ref 12–16)
IMM GRANULOCYTES # BLD AUTO: 0.55 X10*3/UL (ref 0–0.5)
IMM GRANULOCYTES NFR BLD AUTO: 4.8 % (ref 0–0.9)
INR PPP: 1.2 (ref 0.9–1.1)
KETONES UR STRIP.AUTO-MCNC: NEGATIVE MG/DL
LACTATE SERPL-SCNC: 0.8 MMOL/L (ref 0.4–2)
LEUKOCYTE ESTERASE UR QL STRIP.AUTO: NEGATIVE
LYMPHOCYTES # BLD AUTO: 2.04 X10*3/UL (ref 0.8–3)
LYMPHOCYTES NFR BLD AUTO: 17.7 %
MCH RBC QN AUTO: 29.9 PG (ref 26–34)
MCHC RBC AUTO-ENTMCNC: 33.3 G/DL (ref 32–36)
MCV RBC AUTO: 90 FL (ref 80–100)
MONOCYTES # BLD AUTO: 0.72 X10*3/UL (ref 0.05–0.8)
MONOCYTES NFR BLD AUTO: 6.2 %
NEUTROPHILS # BLD AUTO: 8.08 X10*3/UL (ref 1.6–5.5)
NEUTROPHILS NFR BLD AUTO: 70.1 %
NITRITE UR QL STRIP.AUTO: NEGATIVE
NRBC BLD-RTO: 0 /100 WBCS (ref 0–0)
PH UR STRIP.AUTO: 6 [PH]
PLATELET # BLD AUTO: 414 X10*3/UL (ref 150–450)
POLYCHROMASIA BLD QL SMEAR: NORMAL
POTASSIUM SERPL-SCNC: 3.5 MMOL/L (ref 3.5–5.3)
PROT SERPL-MCNC: 6.5 G/DL (ref 6.4–8.2)
PROT UR STRIP.AUTO-MCNC: NEGATIVE MG/DL
PROTHROMBIN TIME: 13.9 SECONDS (ref 9.8–12.8)
RBC # BLD AUTO: 3.88 X10*6/UL (ref 4–5.2)
RBC # UR STRIP.AUTO: NEGATIVE /UL
RBC MORPH BLD: NORMAL
SODIUM SERPL-SCNC: 140 MMOL/L (ref 136–145)
SP GR UR STRIP.AUTO: 1.01
UROBILINOGEN UR STRIP.AUTO-MCNC: NORMAL MG/DL
WBC # BLD AUTO: 11.5 X10*3/UL (ref 4.4–11.3)

## 2024-06-15 PROCEDURE — 85025 COMPLETE CBC W/AUTO DIFF WBC: CPT | Performed by: STUDENT IN AN ORGANIZED HEALTH CARE EDUCATION/TRAINING PROGRAM

## 2024-06-15 PROCEDURE — 71275 CT ANGIOGRAPHY CHEST: CPT

## 2024-06-15 PROCEDURE — 85610 PROTHROMBIN TIME: CPT | Performed by: STUDENT IN AN ORGANIZED HEALTH CARE EDUCATION/TRAINING PROGRAM

## 2024-06-15 PROCEDURE — 84484 ASSAY OF TROPONIN QUANT: CPT | Mod: 91 | Performed by: STUDENT IN AN ORGANIZED HEALTH CARE EDUCATION/TRAINING PROGRAM

## 2024-06-15 PROCEDURE — 36415 COLL VENOUS BLD VENIPUNCTURE: CPT | Performed by: STUDENT IN AN ORGANIZED HEALTH CARE EDUCATION/TRAINING PROGRAM

## 2024-06-15 PROCEDURE — 83605 ASSAY OF LACTIC ACID: CPT | Performed by: STUDENT IN AN ORGANIZED HEALTH CARE EDUCATION/TRAINING PROGRAM

## 2024-06-15 PROCEDURE — 71275 CT ANGIOGRAPHY CHEST: CPT | Performed by: RADIOLOGY

## 2024-06-15 PROCEDURE — 2550000001 HC RX 255 CONTRASTS: Performed by: STUDENT IN AN ORGANIZED HEALTH CARE EDUCATION/TRAINING PROGRAM

## 2024-06-15 PROCEDURE — 81003 URINALYSIS AUTO W/O SCOPE: CPT | Performed by: STUDENT IN AN ORGANIZED HEALTH CARE EDUCATION/TRAINING PROGRAM

## 2024-06-15 PROCEDURE — 84484 ASSAY OF TROPONIN QUANT: CPT | Performed by: STUDENT IN AN ORGANIZED HEALTH CARE EDUCATION/TRAINING PROGRAM

## 2024-06-15 PROCEDURE — 71045 X-RAY EXAM CHEST 1 VIEW: CPT

## 2024-06-15 PROCEDURE — 93005 ELECTROCARDIOGRAM TRACING: CPT

## 2024-06-15 PROCEDURE — 82947 ASSAY GLUCOSE BLOOD QUANT: CPT

## 2024-06-15 PROCEDURE — 71275 CT ANGIOGRAPHY CHEST: CPT | Mod: MUE

## 2024-06-15 PROCEDURE — 99285 EMERGENCY DEPT VISIT HI MDM: CPT | Mod: 25

## 2024-06-15 PROCEDURE — 83880 ASSAY OF NATRIURETIC PEPTIDE: CPT | Performed by: STUDENT IN AN ORGANIZED HEALTH CARE EDUCATION/TRAINING PROGRAM

## 2024-06-15 PROCEDURE — 80053 COMPREHEN METABOLIC PANEL: CPT | Performed by: STUDENT IN AN ORGANIZED HEALTH CARE EDUCATION/TRAINING PROGRAM

## 2024-06-15 PROCEDURE — 71045 X-RAY EXAM CHEST 1 VIEW: CPT | Performed by: RADIOLOGY

## 2024-06-15 PROCEDURE — 85730 THROMBOPLASTIN TIME PARTIAL: CPT | Performed by: STUDENT IN AN ORGANIZED HEALTH CARE EDUCATION/TRAINING PROGRAM

## 2024-06-15 RX ADMIN — IOHEXOL 100 ML: 350 INJECTION, SOLUTION INTRAVENOUS at 21:10

## 2024-06-15 RX ADMIN — IOHEXOL 75 ML: 350 INJECTION, SOLUTION INTRAVENOUS at 18:36

## 2024-06-15 ASSESSMENT — PAIN SCALES - GENERAL: PAINLEVEL_OUTOF10: 0 - NO PAIN

## 2024-06-15 ASSESSMENT — COLUMBIA-SUICIDE SEVERITY RATING SCALE - C-SSRS
1. IN THE PAST MONTH, HAVE YOU WISHED YOU WERE DEAD OR WISHED YOU COULD GO TO SLEEP AND NOT WAKE UP?: NO
2. HAVE YOU ACTUALLY HAD ANY THOUGHTS OF KILLING YOURSELF?: NO
6. HAVE YOU EVER DONE ANYTHING, STARTED TO DO ANYTHING, OR PREPARED TO DO ANYTHING TO END YOUR LIFE?: NO

## 2024-06-15 NOTE — ED PROVIDER NOTES
"  External Records Reviewed: Discharge summary from 6/11/2024  Independent Historians: Patient family    HPI  Johnny Motta is a 73 y.o. female with a history of recent hospitalization due to pneumonia and sepsis who is presenting with lightheadedness.  Patient states she was discharged on Tuesday after a hospitalization.  She notes she thought she was improving and went to the grocery store today.  Patient states while in the grocery store she had sudden onset lightheadedness.  Patient states she thought she was get a pass out.  Patient notes she felt like her heart was pounding.  She states the lightheadedness lasted only about a minute however the heart pounding lasted about 2 hours.  Patient denies any chest pain or shortness of breath.  She denies any abdominal pain, nausea or vomiting.  She notes good p.o. intake since being discharged home.  She denies any blood thinner use or history of blood clots.  Patient states she feels like she just overdid it.    PMH  Past Medical History:   Diagnosis Date    Diabetes (Multi)     Tobacco abuse        Meds  Current Outpatient Medications   Medication Instructions    aspirin 81 mg, oral, Daily    atorvastatin (LIPITOR) 20 mg, oral, Daily    cholecalciferol (VITAMIN D3) 2,000 Units, oral, Daily    glimepiride (AMARYL) 4 mg, oral, 2 times daily before meals    insulin lispro (HUMALOG) 0-10 Units, subcutaneous, 3 times daily before meals, 3 times per day before meals as directed.<BR>See attached sliding scale.    Lantus Solostar U-100 Insulin 12 Units, subcutaneous, Nightly, Take as directed per insulin instructions.    levoFLOXacin (LEVAQUIN) 500 mg, oral, Daily    pen needle, diabetic (TechLITE Pen Needle) 32 gauge x 5/32\" needle 1 each, subcutaneous, Daily       Allergies  Allergies   Allergen Reactions    Penicillin G Rash        SHx  Social History     Tobacco Use    Smoking status: Every Day     Current packs/day: 0.50     Average packs/day: 0.5 packs/day for 25.2 " years (12.6 ttl pk-yrs)     Types: Cigarettes     Start date: 1999     Passive exposure: Past    Smokeless tobacco: Never   Vaping Use    Vaping status: Never Used   Substance Use Topics    Alcohol use: Never    Drug use: Never       ------------------------------------------------------------------------------------------------------------------------------------------    /64   Pulse 99   Temp 36.4 °C (97.5 °F)   Resp 18   SpO2 96%     Physical Exam  Constitutional:       General: She is not in acute distress.  HENT:      Head: Normocephalic and atraumatic.      Mouth/Throat:      Mouth: Mucous membranes are moist.   Eyes:      Extraocular Movements: Extraocular movements intact.      Conjunctiva/sclera: Conjunctivae normal.      Pupils: Pupils are equal, round, and reactive to light.   Cardiovascular:      Rate and Rhythm: Normal rate and regular rhythm.      Heart sounds: No murmur heard.     No gallop.   Pulmonary:      Effort: Pulmonary effort is normal. No respiratory distress.      Breath sounds: Normal breath sounds. No wheezing.   Abdominal:      General: There is no distension.      Palpations: Abdomen is soft.      Tenderness: There is no abdominal tenderness. There is no guarding.   Musculoskeletal:         General: No swelling or signs of injury. Normal range of motion.   Skin:     General: Skin is warm and dry.      Findings: No lesion or rash.   Neurological:      General: No focal deficit present.      Mental Status: She is alert and oriented to person, place, and time. Mental status is at baseline.   Psychiatric:         Mood and Affect: Mood normal.         Judgment: Judgment normal.          ------------------------------------------------------------------------------------------------------------------------------------------    Medical Decision Makin-year-old female presenting with lightheadedness.  Patient is hemodynamically stable, afebrile, nontoxic-appearing on  presentation.  Patient is currently asymptomatic which is reassuring however she did have an episode of lightheadedness and palpitations.  Patient was recently hospitalized and is high risk for PE or blood clot and this is on the differential.  CT PE will be ordered.  Other considerations include ACS or pneumonia especially as she was just discharged after stay with pneumonia.  UTI is a consideration as well.  Lab work will be ordered.  EKG was reassuring without ST segment changes.  EKG was not concerning for STEMI.  Lab work was notable for mild leukocytosis with a white blood cell count of 11.5.  Further lab work was unremarkable. Chest x-ray showed improving left basilar consolidation.  I was subsequently called by radiology regarding the patient's CT imaging.  They noted that there is diffuse calcified and noncalcified plaque in the thoracic aorta and moderate noncalcified plaque in the aortic arch with focal outpouching this suspicious for penetrating atheromatous ulcer.  Radiology recommended a CTA of the chest with gated protocol which will be ordered.  Patient updated on plan. 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  Given these findings I did discuss the patient with Dr. Nelson who is the vascular surgeon on-call.  Patient's symptoms are most likely unrelated to these findings and Dr. Nelson recommended the patient follow-up in the vascular surgery clinic with him.  Patient remained hemodynamically stable and asymptomatic in the emergency department.  Patient felt comfortable with this plan and return precautions were discussed.  Patient was discharged home in stable condition.    Independent EKG interpretation:  Sinus rhythm, rate 94 bpm, normal axis, QTc 454 ms, right bundle branch block, no ST segment elevations or depressions.  Not concerning for STEMI.  Diagnoses as of 06/15/24 2334   Lightheadedness        Discussed with: Dr. Nelson with vascular surgery.      Radha Sosa MD  Emergency Medicine       Radha Sosa MD  06/15/24 [id number]

## 2024-06-16 VITALS
HEART RATE: 69 BPM | OXYGEN SATURATION: 98 % | SYSTOLIC BLOOD PRESSURE: 119 MMHG | RESPIRATION RATE: 16 BRPM | TEMPERATURE: 97.5 F | DIASTOLIC BLOOD PRESSURE: 73 MMHG

## 2024-06-16 ASSESSMENT — PAIN SCALES - GENERAL: PAINLEVEL_OUTOF10: 0 - NO PAIN

## 2024-06-16 NOTE — DISCHARGE INSTRUCTIONS
Please return the emergency department if you develop worsening lightheadedness, chest pain, shortness of breath.  Please follow-up with your primary care doctor and vascular surgery.

## 2024-06-17 LAB
ATRIAL RATE: 112 BPM
ATRIAL RATE: 93 BPM
P AXIS: 136 DEGREES
P AXIS: 69 DEGREES
PR INTERVAL: 115 MS
PR INTERVAL: 132 MS
Q ONSET: 249 MS
Q ONSET: 253 MS
QRS COUNT: 14 BEATS
QRS COUNT: 18 BEATS
QRS DURATION: 130 MS
QRS DURATION: 132 MS
QT INTERVAL: 329 MS
QT INTERVAL: 356 MS
QTC CALCULATION(BAZETT): 443 MS
QTC CALCULATION(BAZETT): 447 MS
QTC FREDERICIA: 404 MS
QTC FREDERICIA: 412 MS
R AXIS: -28 DEGREES
R AXIS: 174 DEGREES
T AXIS: 15 DEGREES
T AXIS: 19 DEGREES
T OFFSET: 414 MS
T OFFSET: 431 MS
VENTRICULAR RATE: 111 BPM
VENTRICULAR RATE: 93 BPM

## 2024-06-20 LAB
ATRIAL RATE: 78 BPM
P AXIS: 63 DEGREES
PR INTERVAL: 127 MS
Q ONSET: 253 MS
QRS COUNT: 13 BEATS
QRS DURATION: 122 MS
QT INTERVAL: 386 MS
QTC CALCULATION(BAZETT): 440 MS
QTC FREDERICIA: 421 MS
R AXIS: -25 DEGREES
T AXIS: 9 DEGREES
T OFFSET: 446 MS
VENTRICULAR RATE: 78 BPM

## 2024-07-05 ENCOUNTER — APPOINTMENT (OUTPATIENT)
Dept: ENDOCRINOLOGY | Facility: CLINIC | Age: 74
End: 2024-07-05
Payer: COMMERCIAL

## 2024-07-08 ENCOUNTER — TELEPHONE (OUTPATIENT)
Dept: GASTROENTEROLOGY | Facility: CLINIC | Age: 74
End: 2024-07-08
Payer: COMMERCIAL

## 2024-07-12 ENCOUNTER — TELEPHONE (OUTPATIENT)
Dept: GASTROENTEROLOGY | Facility: CLINIC | Age: 74
End: 2024-07-12
Payer: COMMERCIAL

## 2024-07-12 NOTE — TELEPHONE ENCOUNTER
----- Message from Nishant LUNA sent at 7/12/2024 10:04 AM EDT -----  Regarding: FW: Open access  Patient scheduled for 8/14/24 with Dr. Hagen  ----- Message -----  From: Lillian Kaba MA  Sent: 7/10/2024   1:46 PM EDT  To: [id number]  Subject: RE: Open access                                  Left message on machine to return call  ----- Message -----  From: Ayleen Kuhn RN  Sent: 7/9/2024   3:17 PM EDT  To: [id number]  Subject: Open access                                      Open access

## 2024-07-22 NOTE — TELEPHONE ENCOUNTER
Pt called - hadn't received paperwork for colonoscopy - when verifying the address with patient - we had the wrong apt number.  Chart was updated and new packet sent out per pt request.

## 2024-07-29 ENCOUNTER — PATIENT OUTREACH (OUTPATIENT)
Dept: CARE COORDINATION | Facility: CLINIC | Age: 74
End: 2024-07-29
Payer: COMMERCIAL

## 2024-08-08 ENCOUNTER — APPOINTMENT (OUTPATIENT)
Dept: VASCULAR SURGERY | Facility: CLINIC | Age: 74
End: 2024-08-08
Payer: COMMERCIAL

## 2024-08-08 VITALS
DIASTOLIC BLOOD PRESSURE: 71 MMHG | HEART RATE: 87 BPM | WEIGHT: 113 LBS | SYSTOLIC BLOOD PRESSURE: 133 MMHG | BODY MASS INDEX: 20.67 KG/M2

## 2024-08-08 DIAGNOSIS — I71.011 AORTIC ARCH DISSECTION (MULTI): Primary | ICD-10-CM

## 2024-08-08 NOTE — PROGRESS NOTES
Subjective   Patient ID: Johnny Motta is a 73 y.o. female who presents for New Patient Visit (Hospital f/up ulcers of aortic arch ).  HPI  Patient is follow-up from recent hospitalization secondary to legionnaires disease.  Patient had general malaise brought to hospital for evaluation and therapy  At this time she is doing well  No evidence of chest pain shortness of breath  No other specific symptom complaints in the office today.    Review of Systems  Review of Systems    Constitutional:  no generalized malaise overall feels well, energy levels intact, no complaints specifically noted  HEENT:  No blurry vision, no visual aides noted, no hearing loss no ear ache no nose bleeds noted, no dysphagia, no congestion otherwise no pertinent positives noted  Cardiovascular:  no palpitations, chest pain or heaviness noted, no leg swelling, no numbness or tingling in the lower extremity noted  Respiratory:  no shortness of breath, no productive cough noted, no conversation dyspnea or difficulty breathing  Gastrointestinal:  no abdominal pain, no nausea or vomiting, appetite intact, no bowel irregularities noted  Genitourinary:   no urinary incontinence, frequency or urgency issues noted, no hematuria or burning sensation issues  Musculoskeletal:  No muscle aches or pains, no joint discomfort noted, no back pain noted otherwise feels well  Skin: no ulcerations, skin color issues or wounds upper or lower extremities  Neurologic: no dizziness, no hemiplegia, no hemiparesis, no obvious visual deficits noted  Psychiatric: no depression, no memory loss noted, no suicidal ideation  Endocrine: no weight loss or gain, no temperature concerns hot or cold intolerance  Hemogolotic/Lymphatic: no bruising, excessive bleeding, no swelling in the groins or neck noted    Objective   Physical Exam  Physical exam    Constitutional: alert and in no acute distress verbal  Eyes: No erythema swelling or discharge noted  Neck: supple, symmetric,  trachea midline, no masses noted  Cardiovascular: Carotid pulses 2+, no obvious bruit, no Jugular distension noted, no thrill, heart regular rate, lower extremity vascular exam intact, cap refill <2 sec  Pulmonary:  Bilateral breath sounds intact, clear with rales rhonchi or wheeze  Abdomen: soft non tender, no pulsatile masses noted, no rebound rigidity or guarding noted  Skin: intact warm no abnormal turgor  Psychiatric: alert without any obvious cognitive issues, oriented to person, place, and time    Assessment/Plan   Aortic arch disease  Will refer to aortic center Dr. Fan for further evaluation and follow-up           [place] DO 08/08/24 11:20 AM

## 2024-08-14 ENCOUNTER — APPOINTMENT (OUTPATIENT)
Dept: GASTROENTEROLOGY | Facility: HOSPITAL | Age: 74
End: 2024-08-14
Payer: COMMERCIAL

## 2024-09-23 ENCOUNTER — HOSPITAL ENCOUNTER (OUTPATIENT)
Dept: RADIOLOGY | Facility: CLINIC | Age: 74
Discharge: HOME | End: 2024-09-23
Payer: COMMERCIAL

## 2024-09-23 DIAGNOSIS — F17.210 NICOTINE DEPENDENCE, CIGARETTES, UNCOMPLICATED: ICD-10-CM

## 2024-09-23 PROCEDURE — 71271 CT THORAX LUNG CANCER SCR C-: CPT

## 2024-09-23 PROCEDURE — 71271 CT THORAX LUNG CANCER SCR C-: CPT | Performed by: RADIOLOGY

## 2025-02-18 NOTE — PROGRESS NOTES
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 She was referred to Dr. Sam with vascular surgery and seen in his clinic on 8/8/24. Dr. Sam referred her to the aortic center for further evaluation. She is asymptomatic, denying chest and back pain. She has smoked 1/2 PPD for 30 years and is working on cutting back and quitting. She is adopted and does not know if she has a family history of connective tissue or aortic disease.     Past Medical History:   Diagnosis Date    Diabetes (Multi)     Tobacco abuse        Past Surgical History:   Procedure Laterality Date    CHOLECYSTECTOMY  09/12/2018    Cholecystectomy       Family History   Problem Relation Name Age of Onset    Heart attack Mother         Social History     Socioeconomic History    Marital status:      Spouse name: Not on file    Number of children: Not on file    Years of education: Not on file    Highest education level: Not on file   Occupational History    Not on file   Tobacco Use    Smoking status: Every Day     Current packs/day: 0.50     Average packs/day: 0.5 packs/day for 25.9 years (12.9 ttl pk-yrs)     Types: Cigarettes     Start date: 4/12/1999     Passive exposure: Past    Smokeless tobacco: Never   Vaping Use    Vaping status: Never Used   Substance and Sexual Activity    Alcohol use: Never    Drug use: Never    Sexual activity: Not Currently   Other Topics Concern    Not on file   Social History Narrative    Not on file     Social Drivers of Health     Financial Resource Strain: Low Risk  (6/10/2024)    Overall Financial Resource Strain (CARDIA)     Difficulty of Paying Living Expenses: Not very hard   Food Insecurity: Not on file   Transportation Needs: No Transportation  "Needs (6/10/2024)    PRAPARE - Transportation     Lack of Transportation (Medical): No     Lack of Transportation (Non-Medical): No   Physical Activity: Not on file   Stress: Not on file   Social Connections: Not on file   Intimate Partner Violence: Not on file   Housing Stability: Low Risk  (6/10/2024)    Housing Stability Vital Sign     Unable to Pay for Housing in the Last Year: No     Number of Places Lived in the Last Year: 1     Unstable Housing in the Last Year: No       Allergies   Allergen Reactions    Penicillin G Rash       Outpatient Encounter Medications as of 2/19/2025   Medication Sig Dispense Refill    aspirin 81 mg EC tablet Take 1 tablet (81 mg) by mouth once daily.      atorvastatin (Lipitor) 20 mg tablet Take 1 tablet (20 mg) by mouth once daily.      cholecalciferol (Vitamin D3) 50 MCG (2000 UT) tablet Take 1 tablet (2,000 Units) by mouth once daily.      glimepiride (Amaryl) 4 mg tablet Take 1 tablet (4 mg) by mouth 2 times a day before meals. (Patient taking differently: Take 1 tablet (4 mg) by mouth once daily in the morning. Take before meals.) 60 tablet 0    insulin glargine (Lantus Solostar U-100 Insulin) 100 unit/mL (3 mL) pen Inject 12 Units under the skin once daily at bedtime. Take as directed per insulin instructions. 15 mL 1    insulin lispro (HumaLOG) 100 unit/mL injection Inject 0-10 Units under the skin 3 times a day before meals. 3 times per day before meals as directed.  See attached sliding scale. 30 mL 1    pen needle, diabetic (TechLITE Pen Needle) 32 gauge x 5/32\" needle Inject 1 each under the skin once daily. 100 each 0     No facility-administered encounter medications on file as of 2/19/2025.       Physical Exam  Constitutional:       General: She is not in acute distress.     Appearance: Normal appearance. She is normal weight. She is not ill-appearing.   Cardiovascular:      Rate and Rhythm: Normal rate and regular rhythm.      Pulses: Normal pulses.   Pulmonary:      " Effort: Pulmonary effort is normal. No respiratory distress.      Breath sounds: No wheezing.   Abdominal:      General: There is no distension.      Palpations: Abdomen is soft.   Musculoskeletal:      Right lower leg: No edema.      Left lower leg: No edema.   Skin:     General: Skin is warm and dry.   Neurological:      General: No focal deficit present.      Mental Status: She is alert and oriented to person, place, and time. Mental status is at baseline.   Psychiatric:         Mood and Affect: Mood normal.         Behavior: Behavior normal.     No results found for this or any previous visit (from the past 4464 hours).    Lab Results   Component Value Date    WBC 11.5 (H) 06/15/2024    HGB 11.6 (L) 06/15/2024    HCT 34.8 (L) 06/15/2024    MCV 90 06/15/2024     06/15/2024     Lab Results   Component Value Date    GLUCOSE 132 (H) 06/15/2024    CALCIUM 9.5 06/15/2024     06/15/2024    K 3.5 06/15/2024    CO2 27 06/15/2024     06/15/2024    BUN 19 06/15/2024    CREATININE 0.79 06/15/2024     Transthoracic Echo (TTE) Complete    Result Date: 4/12/2024              [place] [address]      Phone [phone] Fax [phone] TRANSTHORACIC ECHOCARDIOGRAM REPORT  Patient Name:      LITA MCCLURE FLACA Carty Physician:    [id number] Downey MD Study Date:        4/12/2024             Ordering Provider:    [id number] ILDA BROCK MRN/PID:           [id number]              Fellow: Accession#:        [id number]          Nurse: Date of Birth/Age: 1950 / 73 years Sonographer:          Swapna Antoine RDCS Gender:            F                     Additional Staff: Height:            157.48 cm             Admit Date:           4/11/2024 Weight:            49.90 kg              Admission Status:     Inpatient -                                                                 Routine BSA / BMI:         1.48 m2 / 20.12 kg/m2 Department Location:  [facility] Echo                                                                Lab Blood Pressure: 116 /67 mmHg Study Type:    TRANSTHORACIC ECHO (TTE) COMPLETE Diagnosis/ICD: Chest pain, unspecified-R07.9 Indication:    Chest Pain CPT Codes:     Echo Complete w Full [id number] Patient History: No pertinent past medical history. Study Detail: The following Echo studies were performed: 2D, M-Mode, Doppler and               color flow.  PHYSICIAN INTERPRETATION: Left Ventricle: Left ventricular systolic function is normal, with an estimated ejection fraction of 60-65%. There are no regional wall motion abnormalities. The left ventricular cavity size is normal. Spectral Doppler shows an impaired relaxation pattern of left ventricular diastolic filling. Left Atrium: The left atrium is normal in size. Right Ventricle: The right ventricle is normal in size. There is normal right ventricular global systolic function. Right Atrium: The right atrium is mildly dilated. Aortic Valve: The aortic valve is trileaflet. There is no evidence of aortic valve regurgitation. The peak instantaneous gradient of the aortic valve is 8.1 mmHg. The mean gradient of the aortic valve is 4.0 mmHg. Mitral Valve: The mitral valve is normal in structure. There is trace mitral valve regurgitation. Tricuspid Valve: The tricuspid valve is structurally normal. There is trace tricuspid regurgitation. The Doppler estimated RVSP is within normal limits at 21.8 mmHg. Pulmonic Valve: The pulmonic valve is not well visualized. There is trace pulmonic valve regurgitation. Pericardium: There is no pericardial effusion noted. There is a pericardial fat pad present. Aorta: The aortic root is normal. Systemic Veins: The inferior vena cava size appears small.  CONCLUSIONS:  1. Left ventricular systolic function is normal with a 60-65% estimated  ejection fraction.  2. Spectral Doppler shows an impaired relaxation pattern of left ventricular diastolic filling.  3. RVSP within normal limits. QUANTITATIVE DATA SUMMARY: 2D MEASUREMENTS:                          Normal Ranges: Ao Root d:     3.00 cm   (2.0-3.7cm) LAs:           3.10 cm   (2.7-4.0cm) IVSd:          0.80 cm   (0.6-1.1cm) LVPWd:         0.80 cm   (0.6-1.1cm) LVIDd:         4.00 cm   (3.9-5.9cm) LVIDs:         2.30 cm LV Mass Index: 63.0 g/m2 LV % FS        42.5 % LA VOLUME:                               Normal Ranges: LA Vol A4C:        23.1 ml    (22+/-6mL/m2) LA Vol A2C:        30.1 ml LA Vol BP:         28.0 ml LA Vol Index A4C:  15.6ml/m2 LA Vol Index A2C:  20.3 ml/m2 LA Vol Index BP:   18.9 ml/m2 LA Area A4C:       12.0 cm2 LA Area A2C:       12.9 cm2 LA Major Axis A4C: 5.3 cm LA Major Axis A2C: 4.7 cm LA Volume Index:   18.9 ml/m2 RA VOLUME BY A/L METHOD:                       Normal Ranges: RA Area A4C: 14.1 cm2 AORTA MEASUREMENTS:                      Normal Ranges: Ao Sinus, d: 3.00 cm (2.1-3.5cm) Ao STJ, d:   2.60 cm (1.7-3.4cm) Asc Ao, d:   2.90 cm (2.1-3.4cm) LV SYSTOLIC FUNCTION BY 2D PLANIMETRY (MOD):                     Normal Ranges: EF-A4C View: 66.7 % (>=55%) EF-A2C View: 63.7 % EF-Biplane:  64.8 % LV DIASTOLIC FUNCTION:                           Normal Ranges: MV Peak E:    0.70 m/s    (0.7-1.2 m/s) MV Peak A:    0.87 m/s    (0.42-0.7 m/s) E/A Ratio:    0.81        (1.0-2.2) MV e'         0.06 m/s    (>8.0) MV lateral e' 0.08 m/s MV medial e'  0.05 m/s MV A Dur:     135.00 msec E/e' Ratio:   10.82       (<8.0) AORTIC VALVE:                                   Normal Ranges: AoV Vmax:                1.42 m/s (<=1.7m/s) AoV Peak P.1 mmHg (<20mmHg) AoV Mean P.0 mmHg (1.7-11.5mmHg) LVOT Max Devyn:            1.08 m/s (<=1.1m/s) AoV VTI:                 31.00 cm (18-25cm) LVOT VTI:                21.90 cm LVOT Diameter:           1.90 cm  (1.8-2.4cm) AoV  Area, VTI:           2.00 cm2 (2.5-5.5cm2) AoV Area,Vmax:           2.16 cm2 (2.5-4.5cm2) AoV Dimensionless Index: 0.71  RIGHT VENTRICLE: RV Basal 3.30 cm RV Mid   2.50 cm RV Major 6.6 cm TAPSE:   22.4 mm RV s'    0.11 m/s TRICUSPID VALVE/RVSP:                             Normal Ranges: Peak TR Velocity: 2.17 m/s RV Syst Pressure: 21.8 mmHg (< 30mmHg) IVC Diam:         1.00 cm  [id number] Davis Downey MD Electronically signed on 4/12/2024 at 11:08:05 AM  ** Final **       Assessment and Plan:   Ms. 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     Marixa Phillip MD  Cardiac Surgeon

## 2025-02-19 ENCOUNTER — OFFICE VISIT (OUTPATIENT)
Dept: CARDIAC SURGERY | Facility: CLINIC | Age: 75
End: 2025-02-19
Payer: COMMERCIAL

## 2025-02-19 VITALS
WEIGHT: 114 LBS | OXYGEN SATURATION: 95 % | BODY MASS INDEX: 20.98 KG/M2 | SYSTOLIC BLOOD PRESSURE: 112 MMHG | HEIGHT: 62 IN | DIASTOLIC BLOOD PRESSURE: 61 MMHG | HEART RATE: 74 BPM

## 2025-02-19 DIAGNOSIS — Q25.40 ABNORMALITY OF AORTIC ARCH (HHS-HCC): ICD-10-CM

## 2025-02-19 PROCEDURE — 1126F AMNT PAIN NOTED NONE PRSNT: CPT | Performed by: STUDENT IN AN ORGANIZED HEALTH CARE EDUCATION/TRAINING PROGRAM

## 2025-02-19 PROCEDURE — 99202 OFFICE O/P NEW SF 15 MIN: CPT | Performed by: STUDENT IN AN ORGANIZED HEALTH CARE EDUCATION/TRAINING PROGRAM

## 2025-02-19 PROCEDURE — 1159F MED LIST DOCD IN RCRD: CPT | Performed by: STUDENT IN AN ORGANIZED HEALTH CARE EDUCATION/TRAINING PROGRAM

## 2025-02-19 PROCEDURE — 99212 OFFICE O/P EST SF 10 MIN: CPT | Performed by: STUDENT IN AN ORGANIZED HEALTH CARE EDUCATION/TRAINING PROGRAM

## 2025-02-19 ASSESSMENT — COLUMBIA-SUICIDE SEVERITY RATING SCALE - C-SSRS
1. IN THE PAST MONTH, HAVE YOU WISHED YOU WERE DEAD OR WISHED YOU COULD GO TO SLEEP AND NOT WAKE UP?: NO
6. HAVE YOU EVER DONE ANYTHING, STARTED TO DO ANYTHING, OR PREPARED TO DO ANYTHING TO END YOUR LIFE?: NO
2. HAVE YOU ACTUALLY HAD ANY THOUGHTS OF KILLING YOURSELF?: NO

## 2025-02-19 ASSESSMENT — PAIN SCALES - GENERAL: PAINLEVEL_OUTOF10: 0-NO PAIN

## 2025-02-19 ASSESSMENT — ENCOUNTER SYMPTOMS
DEPRESSION: 0
LOSS OF SENSATION IN FEET: 0
OCCASIONAL FEELINGS OF UNSTEADINESS: 0

## 2025-03-15 LAB
ANION GAP SERPL CALCULATED.4IONS-SCNC: 12 MMOL/L (CALC) (ref 7–17)
BUN SERPL-MCNC: 17 MG/DL (ref 7–25)
BUN/CREAT SERPL: ABNORMAL (CALC) (ref 6–22)
CALCIUM SERPL-MCNC: 10.2 MG/DL (ref 8.6–10.4)
CHLORIDE SERPL-SCNC: 106 MMOL/L (ref 98–110)
CO2 SERPL-SCNC: 23 MMOL/L (ref 20–32)
CREAT SERPL-MCNC: 0.67 MG/DL (ref 0.6–1)
EGFRCR SERPLBLD CKD-EPI 2021: 91 ML/MIN/1.73M2
GLUCOSE SERPL-MCNC: 240 MG/DL (ref 65–139)
POTASSIUM SERPL-SCNC: 4.3 MMOL/L (ref 3.5–5.3)
SODIUM SERPL-SCNC: 141 MMOL/L (ref 135–146)

## 2025-03-19 ENCOUNTER — HOSPITAL ENCOUNTER (OUTPATIENT)
Dept: RADIOLOGY | Facility: HOSPITAL | Age: 75
Discharge: HOME | End: 2025-03-19
Payer: COMMERCIAL

## 2025-03-19 DIAGNOSIS — Q25.40 ABNORMALITY OF AORTIC ARCH (HHS-HCC): ICD-10-CM

## 2025-03-19 PROCEDURE — 71275 CT ANGIOGRAPHY CHEST: CPT

## 2025-03-19 PROCEDURE — 2550000001 HC RX 255 CONTRASTS: Performed by: STUDENT IN AN ORGANIZED HEALTH CARE EDUCATION/TRAINING PROGRAM

## 2025-03-19 RX ADMIN — IOHEXOL 100 ML: 350 INJECTION, SOLUTION INTRAVENOUS at 10:11

## 2025-03-20 ENCOUNTER — TELEPHONE (OUTPATIENT)
Dept: CARDIAC SURGERY | Facility: HOSPITAL | Age: 75
End: 2025-03-20
Payer: COMMERCIAL

## 2025-03-20 NOTE — TELEPHONE ENCOUNTER
Per Secure Chat from Dr. Phillip today:  'Zachary Barber, I just reviewed Johnny Motta's repeat CTA - I had seen her a few weeks ago for penetrating aortic ulcers. Could you please let her know that her CTA is stable and that we will plan to repeat a CTA in 1 year for surveillance.'  I spoke to the patient and gave her results.  Patient verbalized understanding and will repeat the test in a year.